# Patient Record
Sex: FEMALE | Race: WHITE | NOT HISPANIC OR LATINO | Employment: FULL TIME | URBAN - METROPOLITAN AREA
[De-identification: names, ages, dates, MRNs, and addresses within clinical notes are randomized per-mention and may not be internally consistent; named-entity substitution may affect disease eponyms.]

---

## 2019-08-26 ENCOUNTER — TRANSCRIBE ORDERS (OUTPATIENT)
Dept: LAB | Facility: HOSPITAL | Age: 34
End: 2019-08-26

## 2019-08-26 ENCOUNTER — LAB (OUTPATIENT)
Dept: LAB | Facility: HOSPITAL | Age: 34
End: 2019-08-26

## 2019-08-26 DIAGNOSIS — Z34.81 PRENATAL CARE, SUBSEQUENT PREGNANCY, FIRST TRIMESTER: ICD-10-CM

## 2019-08-26 DIAGNOSIS — Z3A.12 12 WEEKS GESTATION OF PREGNANCY: ICD-10-CM

## 2019-08-26 DIAGNOSIS — Z3A.12 12 WEEKS GESTATION OF PREGNANCY: Primary | ICD-10-CM

## 2019-08-26 LAB
ABO GROUP BLD: NORMAL
AMPHET+METHAMPHET UR QL: NEGATIVE
AMPHETAMINES UR QL: NEGATIVE
BARBITURATES UR QL SCN: NEGATIVE
BASOPHILS # BLD AUTO: 0.03 10*3/MM3 (ref 0–0.2)
BASOPHILS NFR BLD AUTO: 0.3 % (ref 0–1.5)
BENZODIAZ UR QL SCN: NEGATIVE
BLD GP AB SCN SERPL QL: NEGATIVE
BUPRENORPHINE SERPL-MCNC: NEGATIVE NG/ML
CANNABINOIDS SERPL QL: NEGATIVE
COCAINE UR QL: NEGATIVE
DEPRECATED RDW RBC AUTO: 43.8 FL (ref 37–54)
EOSINOPHIL # BLD AUTO: 0.14 10*3/MM3 (ref 0–0.4)
EOSINOPHIL NFR BLD AUTO: 1.3 % (ref 0.3–6.2)
ERYTHROCYTE [DISTWIDTH] IN BLOOD BY AUTOMATED COUNT: 12.1 % (ref 12.3–15.4)
GLUCOSE BLD-MCNC: 69 MG/DL (ref 65–99)
HBV SURFACE AG SERPL QL IA: NORMAL
HCT VFR BLD AUTO: 46.1 % (ref 34–46.6)
HCV AB SER DONR QL: NORMAL
HGB BLD-MCNC: 15 G/DL (ref 12–15.9)
HIV1+2 AB SER QL: NORMAL
IMM GRANULOCYTES # BLD AUTO: 0.03 10*3/MM3 (ref 0–0.05)
IMM GRANULOCYTES NFR BLD AUTO: 0.3 % (ref 0–0.5)
LYMPHOCYTES # BLD AUTO: 0.79 10*3/MM3 (ref 0.7–3.1)
LYMPHOCYTES NFR BLD AUTO: 7.4 % (ref 19.6–45.3)
MCH RBC QN AUTO: 31.6 PG (ref 26.6–33)
MCHC RBC AUTO-ENTMCNC: 32.5 G/DL (ref 31.5–35.7)
MCV RBC AUTO: 97.1 FL (ref 79–97)
METHADONE UR QL SCN: NEGATIVE
MONOCYTES # BLD AUTO: 0.78 10*3/MM3 (ref 0.1–0.9)
MONOCYTES NFR BLD AUTO: 7.3 % (ref 5–12)
NEUTROPHILS # BLD AUTO: 8.87 10*3/MM3 (ref 1.7–7)
NEUTROPHILS NFR BLD AUTO: 83.4 % (ref 42.7–76)
NRBC BLD AUTO-RTO: 0 /100 WBC (ref 0–0.2)
OPIATES UR QL: NEGATIVE
OXYCODONE UR QL SCN: NEGATIVE
PCP UR QL SCN: NEGATIVE
PLATELET # BLD AUTO: 188 10*3/MM3 (ref 140–450)
PMV BLD AUTO: 11.5 FL (ref 6–12)
PROPOXYPH UR QL: NEGATIVE
RBC # BLD AUTO: 4.75 10*6/MM3 (ref 3.77–5.28)
RH BLD: POSITIVE
TRICYCLICS UR QL SCN: NEGATIVE
WBC NRBC COR # BLD: 10.64 10*3/MM3 (ref 3.4–10.8)

## 2019-08-26 PROCEDURE — G0432 EIA HIV-1/HIV-2 SCREEN: HCPCS

## 2019-08-26 PROCEDURE — 86803 HEPATITIS C AB TEST: CPT

## 2019-08-26 PROCEDURE — 82947 ASSAY GLUCOSE BLOOD QUANT: CPT

## 2019-08-26 PROCEDURE — 86850 RBC ANTIBODY SCREEN: CPT

## 2019-08-26 PROCEDURE — 86901 BLOOD TYPING SEROLOGIC RH(D): CPT

## 2019-08-26 PROCEDURE — 86900 BLOOD TYPING SEROLOGIC ABO: CPT

## 2019-08-26 PROCEDURE — 80081 OBSTETRIC PANEL INC HIV TSTG: CPT

## 2019-08-26 PROCEDURE — 80306 DRUG TEST PRSMV INSTRMNT: CPT

## 2019-08-26 PROCEDURE — 87340 HEPATITIS B SURFACE AG IA: CPT

## 2019-08-26 PROCEDURE — 85025 COMPLETE CBC W/AUTO DIFF WBC: CPT

## 2019-08-26 PROCEDURE — 36415 COLL VENOUS BLD VENIPUNCTURE: CPT

## 2019-08-27 LAB — RPR SER QL: NORMAL

## 2019-08-28 LAB — RUBV IGG SERPL IA-ACNC: NORMAL

## 2019-10-10 ENCOUNTER — LAB REQUISITION (OUTPATIENT)
Dept: LAB | Facility: HOSPITAL | Age: 34
End: 2019-10-10

## 2019-10-10 DIAGNOSIS — Z00.00 ROUTINE GENERAL MEDICAL EXAMINATION AT A HEALTH CARE FACILITY: ICD-10-CM

## 2019-10-10 PROCEDURE — 36415 COLL VENOUS BLD VENIPUNCTURE: CPT

## 2019-12-11 ENCOUNTER — LAB (OUTPATIENT)
Dept: LAB | Facility: HOSPITAL | Age: 34
End: 2019-12-11

## 2019-12-11 ENCOUNTER — TRANSCRIBE ORDERS (OUTPATIENT)
Dept: LAB | Facility: HOSPITAL | Age: 34
End: 2019-12-11

## 2019-12-11 DIAGNOSIS — Z34.83 PRENATAL CARE, SUBSEQUENT PREGNANCY, THIRD TRIMESTER: ICD-10-CM

## 2019-12-11 DIAGNOSIS — Z3A.28 28 WEEKS GESTATION OF PREGNANCY: ICD-10-CM

## 2019-12-11 DIAGNOSIS — Z3A.28 28 WEEKS GESTATION OF PREGNANCY: Primary | ICD-10-CM

## 2019-12-11 LAB
BLD GP AB SCN SERPL QL: NEGATIVE
DEPRECATED RDW RBC AUTO: 39.2 FL (ref 37–54)
ERYTHROCYTE [DISTWIDTH] IN BLOOD BY AUTOMATED COUNT: 11.7 % (ref 12.3–15.4)
GLUCOSE 1H P 100 G GLC PO SERPL-MCNC: 157 MG/DL (ref 65–140)
HCT VFR BLD AUTO: 38 % (ref 34–46.6)
HGB BLD-MCNC: 13 G/DL (ref 12–15.9)
MCH RBC QN AUTO: 31.6 PG (ref 26.6–33)
MCHC RBC AUTO-ENTMCNC: 34.2 G/DL (ref 31.5–35.7)
MCV RBC AUTO: 92.2 FL (ref 79–97)
PLATELET # BLD AUTO: 188 10*3/MM3 (ref 140–450)
PMV BLD AUTO: 11.4 FL (ref 6–12)
RBC # BLD AUTO: 4.12 10*6/MM3 (ref 3.77–5.28)
WBC NRBC COR # BLD: 10.72 10*3/MM3 (ref 3.4–10.8)

## 2019-12-11 PROCEDURE — 36415 COLL VENOUS BLD VENIPUNCTURE: CPT

## 2019-12-11 PROCEDURE — 82950 GLUCOSE TEST: CPT

## 2019-12-11 PROCEDURE — 86850 RBC ANTIBODY SCREEN: CPT

## 2019-12-11 PROCEDURE — 85027 COMPLETE CBC AUTOMATED: CPT

## 2019-12-30 ENCOUNTER — TRANSCRIBE ORDERS (OUTPATIENT)
Dept: LAB | Facility: HOSPITAL | Age: 34
End: 2019-12-30

## 2019-12-30 ENCOUNTER — LAB (OUTPATIENT)
Dept: LAB | Facility: HOSPITAL | Age: 34
End: 2019-12-30

## 2019-12-30 DIAGNOSIS — Z34.83 PRENATAL CARE, SUBSEQUENT PREGNANCY, THIRD TRIMESTER: Primary | ICD-10-CM

## 2019-12-30 DIAGNOSIS — Z3A.29 29 WEEKS GESTATION OF PREGNANCY: ICD-10-CM

## 2019-12-30 DIAGNOSIS — Z34.83 PRENATAL CARE, SUBSEQUENT PREGNANCY, THIRD TRIMESTER: ICD-10-CM

## 2019-12-30 LAB
GLUCOSE 1H P 100 G GLC PO SERPL-MCNC: 197 MG/DL (ref 74–180)
GLUCOSE 2H P 100 G GLC PO SERPL-MCNC: 135 MG/DL (ref 74–155)
GLUCOSE 3H P 100 G GLC PO SERPL-MCNC: 135 MG/DL (ref 74–140)
GLUCOSE P FAST SERPL-MCNC: 85 MG/DL (ref 65–99)

## 2019-12-30 PROCEDURE — 36415 COLL VENOUS BLD VENIPUNCTURE: CPT

## 2019-12-30 PROCEDURE — 82951 GLUCOSE TOLERANCE TEST (GTT): CPT

## 2019-12-30 PROCEDURE — 82952 GTT-ADDED SAMPLES: CPT

## 2020-01-31 LAB — EXTERNAL GROUP B STREP ANTIGEN: POSITIVE

## 2020-02-06 ENCOUNTER — HOSPITAL ENCOUNTER (OUTPATIENT)
Facility: HOSPITAL | Age: 35
Discharge: HOME | End: 2020-02-06
Attending: OBSTETRICS & GYNECOLOGY | Admitting: OBSTETRICS & GYNECOLOGY

## 2020-02-06 VITALS
HEART RATE: 96 BPM | BODY MASS INDEX: 33.63 KG/M2 | RESPIRATION RATE: 16 BRPM | DIASTOLIC BLOOD PRESSURE: 78 MMHG | HEIGHT: 64 IN | TEMPERATURE: 98 F | SYSTOLIC BLOOD PRESSURE: 123 MMHG | WEIGHT: 197 LBS

## 2020-02-06 PROBLEM — Z34.93 PREGNANT AND NOT YET DELIVERED IN THIRD TRIMESTER: Status: ACTIVE | Noted: 2020-02-06

## 2020-02-06 PROBLEM — O13.9 GESTATIONAL HYPERTENSION WITHOUT SIGNIFICANT PROTEINURIA: Status: ACTIVE | Noted: 2020-02-06

## 2020-02-06 LAB
ALP SERPL-CCNC: 146 U/L (ref 39–117)
ALT SERPL W P-5'-P-CCNC: 12 U/L (ref 1–33)
AST SERPL-CCNC: 20 U/L (ref 1–32)
BILIRUB SERPL-MCNC: 0.3 MG/DL (ref 0.2–1.2)
CREAT BLD-MCNC: 0.43 MG/DL (ref 0.57–1)
DEPRECATED RDW RBC AUTO: 39.4 FL (ref 37–54)
ERYTHROCYTE [DISTWIDTH] IN BLOOD BY AUTOMATED COUNT: 11.9 % (ref 12.3–15.4)
HCT VFR BLD AUTO: 38.5 % (ref 34–46.6)
HGB BLD-MCNC: 12.4 G/DL (ref 12–15.9)
LDH SERPL-CCNC: 218 U/L (ref 135–214)
MCH RBC QN AUTO: 29.1 PG (ref 26.6–33)
MCHC RBC AUTO-ENTMCNC: 32.2 G/DL (ref 31.5–35.7)
MCV RBC AUTO: 90.4 FL (ref 79–97)
PLATELET # BLD AUTO: 221 10*3/MM3 (ref 140–450)
PMV BLD AUTO: 11.2 FL (ref 6–12)
POC ALBUMIN: NEGATIVE
RBC # BLD AUTO: 4.26 10*6/MM3 (ref 3.77–5.28)
URATE SERPL-MCNC: 2.4 MG/DL (ref 2.4–5.7)
WBC NRBC COR # BLD: 12.92 10*3/MM3 (ref 3.4–10.8)

## 2020-02-06 PROCEDURE — 83615 LACTATE (LD) (LDH) ENZYME: CPT | Performed by: OBSTETRICS & GYNECOLOGY

## 2020-02-06 PROCEDURE — 84550 ASSAY OF BLOOD/URIC ACID: CPT | Performed by: OBSTETRICS & GYNECOLOGY

## 2020-02-06 PROCEDURE — 81002 URINALYSIS NONAUTO W/O SCOPE: CPT | Performed by: OBSTETRICS & GYNECOLOGY

## 2020-02-06 PROCEDURE — 85027 COMPLETE CBC AUTOMATED: CPT | Performed by: OBSTETRICS & GYNECOLOGY

## 2020-02-06 PROCEDURE — 59025 FETAL NON-STRESS TEST: CPT

## 2020-02-06 PROCEDURE — 84075 ASSAY ALKALINE PHOSPHATASE: CPT | Performed by: OBSTETRICS & GYNECOLOGY

## 2020-02-06 PROCEDURE — 84450 TRANSFERASE (AST) (SGOT): CPT | Performed by: OBSTETRICS & GYNECOLOGY

## 2020-02-06 PROCEDURE — 82565 ASSAY OF CREATININE: CPT | Performed by: OBSTETRICS & GYNECOLOGY

## 2020-02-06 PROCEDURE — G0463 HOSPITAL OUTPT CLINIC VISIT: HCPCS

## 2020-02-06 PROCEDURE — 84460 ALANINE AMINO (ALT) (SGPT): CPT | Performed by: OBSTETRICS & GYNECOLOGY

## 2020-02-06 PROCEDURE — 99218 PR INITIAL OBSERVATION CARE/DAY 30 MINUTES: CPT | Performed by: OBSTETRICS & GYNECOLOGY

## 2020-02-06 PROCEDURE — 82247 BILIRUBIN TOTAL: CPT | Performed by: OBSTETRICS & GYNECOLOGY

## 2020-02-07 ENCOUNTER — TRANSCRIBE ORDERS (OUTPATIENT)
Dept: LAB | Facility: HOSPITAL | Age: 35
End: 2020-02-07

## 2020-02-07 ENCOUNTER — LAB (OUTPATIENT)
Dept: LAB | Facility: HOSPITAL | Age: 35
End: 2020-02-07

## 2020-02-07 DIAGNOSIS — Z34.83 PRENATAL CARE, SUBSEQUENT PREGNANCY, THIRD TRIMESTER: ICD-10-CM

## 2020-02-07 DIAGNOSIS — Z3A.37 37 WEEKS GESTATION OF PREGNANCY: Primary | ICD-10-CM

## 2020-02-07 LAB
CREAT UR-MCNC: 193.7 MG/DL
PROT UR-MCNC: 28 MG/DL

## 2020-02-07 PROCEDURE — 82570 ASSAY OF URINE CREATININE: CPT

## 2020-02-07 PROCEDURE — 84156 ASSAY OF PROTEIN URINE: CPT

## 2020-02-07 NOTE — DISCHARGE SUMMARY
Date of Discharge:  2/6/2020    Discharge Diagnosis: Stable pregnancy    Presenting Problem/History of Present Illness  Pregnancy     Hospital Course  Patient is a 34 y.o. female presented with increased Bps at home 142/96      Consults:   Consults     No orders found from 1/8/2020 to 2/7/2020.          Pertinent Test Results:   Lab Results   Component Value Date    WBC 12.92 (H) 02/06/2020    HGB 12.4 02/06/2020    HCT 38.5 02/06/2020    MCV 90.4 02/06/2020     02/06/2020    HEPBSAG Non-Reactive 08/26/2019     Results from last 7 days   Lab Units 02/06/20 2112   AST (SGOT) U/L 20     Results from last 7 days   Lab Units 02/06/20 2112   ALT (SGPT) U/L 12      Results from last 7 days   Lab Units 02/06/20 2112   CREATININE mg/dL 0.43*      Results from last 7 days   Lab Units 02/06/20 2112   BILIRUBIN mg/dL 0.3       Condition on Discharge:  stable    Vital Signs  Temp:  [98 °F (36.7 °C)] 98 °F (36.7 °C)  Heart Rate:  [] 96  Resp:  [16] 16  BP: (123-127)/(78-82) 123/78    Physical Exam:  Vital Signs Range for the last 24 hours  Temperature: Temp:  [98 °F (36.7 °C)] 98 °F (36.7 °C)   BP: BP: (123-127)/(78-82) 123/78   Pulse: Heart Rate:  [] 96   Respirations: Resp:  [16] 16         Discharge Disposition  Home or Self Care    Discharge Medications     Discharge Medications      ASK your doctor about these medications      Instructions Start Date   PRENATAL GUMMY VITAMIN   2 each, Oral, Daily             Activity at Discharge: regular    Follow-up Appointments  Future Appointments   Date Time Provider Department Center   2/18/2020  1:00 PM [facility]     to see Dr. Figueroa in am    Test Results Pending at Discharge       Audrey Love MD  02/06/20  10:05 PM

## 2020-02-07 NOTE — H&P
Admit H&P    Patient Name: Temitope Plata  : 1985  MRN: [id number]  Date of Service: 2020  Referring Provider: Cristel Figueroa     ID: 34 y.o.  at 37w3d    Chief complaint: increased BP at home    Pregnancy course significant for:    Patient Active Problem List    Diagnosis   • Pregnant and not yet delivered in third trimester [Z34.93]   • Gestational hypertension without significant proteinuria [O13.9]       Her prenatal care is benign.  Her previous obstetric/gynecological history is noted for is remarkable for .4th degree tear and repair    The following portions of the patients history were reviewed and updated as appropriate: current medications, allergies, past medical history, past surgical history, past social history and problem list.       REVIEW OF SYSTEMS  irregular contractions   Patient reports good fetal movement.  She denies any vaginal bleeding, leakage of fluid, or contractions.  She denies headache, visual changes, or right upper quadrant pain.  All other systems were reviewed and were negative.    Prenatal Labs  Lab Results   Component Value Date    HGB 12.4 2020    HEPBSAG Non-Reactive 2019    ABO O 2019    RH Positive 2019    ABSCRN Negative 2019    [id number] Non-Reactive 2019    HEPCVIRUSABY Non-Reactive 2019       OB HISTORY    OB History        2    Para   1    Term   1       0    AB   0    Living   1       SAB   0    TAB   0    Ectopic   0    Molar   0    Multiple   0    Live Births   1              PAST MEDICAL HISTORY    Past Medical History:   Diagnosis Date   • Hemorrhoid    • Ovarian cyst      PAST SURGICAL HISTORY     has a past surgical history that includes [place] tooth extraction and Ovarian cyst removal ().  CURRENT MEDICATIONS    No current facility-administered medications on file prior to encounter.      Current Outpatient Medications on File Prior to Encounter   Medication Sig Dispense Refill   •  PRENATAL GUMMY VITAMIN Chew 2 each Daily.       ALLERGIES    Patient has no known allergies.  SOCIAL HISTORY    Social History     Tobacco Use   Smoking Status Never Smoker   Smokeless Tobacco Never Used     Social History     Substance and Sexual Activity   Alcohol Use No     Social History     Substance and Sexual Activity   Drug Use No     FAMILY HISTORY  The patient has a family history of    PHYSICAL EXAMINATION    Vital Signs Range for the last 24 hours  Temperature: Temp:  [98 °F (36.7 °C)] 98 °F (36.7 °C)   Temp Source: Temp src: Oral   BP: BP: (123-127)/(78-82) 123/78   Pulse: Heart Rate:  [] 96   Respirations: Resp:  [16] 16   Weight: 89.4 kg (197 lb)     Constitutional:  Well developed, well nourished, no acute distress, well-groomed. Red facial flushing  HEENT: Grossly normal.  Respiratory:   normal breath sounds.   Cardiovascular:  Normal rate.   Abdomen:  Soft, gravid, nontender.  Uterus: Soft, nontender. Fundus appropriate for dates.  Neurologic:  Alert & oriented x 4,  no focal deficits noted.   Psychiatric:  Speech and behavior appropriate.   Extremities: no cyanosis, clubbing or edema, no evidence of DVT.      External Fetal Monitoring:    Fetal Heart Rate Assessment   Method: Fetal HR Assessment Method: external   Beats/min: Fetal HR (beats/min): 140   Baseline: Fetal Heart Baseline Rate: normal range   Varibility: Fetal HR Variability: moderate (amplitude range 6 to 25 bpm)   Accels: Fetal HR Accelerations: greater than/equal to 15 bpm, lasting at least 15 seconds   Decels: Fetal HR Decelerations: absent   Tracing Category:       Uterine Assessment   Method: Method: external tocotransducer   Frequency (min): Contraction Frequency (Minutes): 3-5   Ctx Count in 10 min:     Duration:     Intensity: Contraction Intensity: mild by palpation   Intensity by IUPC:     Resting Tone: Uterine Resting Tone: soft by palpation   Resting Tone by IUPC:     [place] Units:       Cervix: Exam by: Method:  sterile exam per physician   Dilation:  ft   Effacement:  th   Station:  -3         Labs:      Lab Results (last 24 hours)     Procedure Component Value Units Date/Time    Preeclampsia Panel [[id number]]  (Abnormal) Collected:  20    Specimen:  Blood Updated:  20     Alkaline Phosphatase 146 U/L      ALT (SGPT) 12 U/L      AST (SGOT) 20 U/L      Creatinine 0.43 mg/dL      Total Bilirubin 0.3 mg/dL       U/L      Uric Acid 2.4 mg/dL     CBC (No Diff) [[id number]]  (Abnormal) Collected:  20    Specimen:  Blood Updated:  20     WBC 12.92 10*3/mm3      RBC 4.26 10*6/mm3      Hemoglobin 12.4 g/dL      Hematocrit 38.5 %      MCV 90.4 fL      MCH 29.1 pg      MCHC 32.2 g/dL      RDW 11.9 %      RDW-SD 39.4 fl      MPV 11.2 fL      Platelets 221 10*3/mm3     POC Albumin [[id number]] Collected:  20    Specimen:  Urine Updated:  20     Albumin Negative          ASSESSMENT/PLAN:  34 y.o. female  at 37w3d with     Patient Active Problem List    Diagnosis   • Pregnant and not yet delivered in third trimester [Z34.93]   • Gestational hypertension without significant proteinuria [O13.9]   Normal labs, normal bps here  Pt to see Dr. Figueroa in the am   Audrey Love MD  2020  10:04 PM

## 2020-02-13 ENCOUNTER — HOSPITAL ENCOUNTER (OUTPATIENT)
Facility: HOSPITAL | Age: 35
Setting detail: OBSERVATION
Discharge: HOME | End: 2020-02-13
Attending: OBSTETRICS & GYNECOLOGY | Admitting: OBSTETRICS & GYNECOLOGY

## 2020-02-13 VITALS
WEIGHT: 198 LBS | TEMPERATURE: 98 F | DIASTOLIC BLOOD PRESSURE: 85 MMHG | HEIGHT: 64 IN | BODY MASS INDEX: 33.8 KG/M2 | SYSTOLIC BLOOD PRESSURE: 119 MMHG | HEART RATE: 111 BPM | RESPIRATION RATE: 18 BRPM

## 2020-02-13 PROBLEM — Z34.90 PREGNANCY: Status: ACTIVE | Noted: 2020-02-13

## 2020-02-13 PROCEDURE — 59025 FETAL NON-STRESS TEST: CPT

## 2020-02-13 PROCEDURE — 99218 PR INITIAL OBSERVATION CARE/DAY 30 MINUTES: CPT | Performed by: OBSTETRICS & GYNECOLOGY

## 2020-02-13 PROCEDURE — G0378 HOSPITAL OBSERVATION PER HR: HCPCS

## 2020-02-13 NOTE — H&P
\[facility]  Obstetric History and Physical    Referring Provider: Cristel Figueroa MD      Chief Complaint   Patient presents with   • Contractions       Subjective     Patient is a 34 y.o. female  currently at 38w3d, who presents with C/O contractions.  Patient reports mild irregular contractions started at 3 AM this morning without associated leaking of fluid or vaginal bleeding.  Since that time the contractions vaccine coming less frequent and less painful without any other concerns or new complaints.  Prenatal care by Dr. Figueroa without complications to date other than transient hypertension without proteinuria.  OB history significant for previous term vaginal delivery..    .    The following portions of the patients history were reviewed and updated as appropriate: current medications, allergies, past medical history, past surgical history, past family history, past social history and problem list .       Prenatal Information:   Maternal Prenatal Labs  Blood Type No results found for: ABO   Rh Status No results found for: RH   Antibody Screen No results found for: ABSCRN   Gonnorhea No results found for: GCCX   Chlamydia No results found for: CLAMYDCU   RPR No results found for: RPR   Syphilis Antibody No results found for: SYPHILIS   Rubella No results found for: RUBELLAIGGIN   Hepatitis B Surface Antigen No results found for: HEPBSAG   HIV-1 Antibody No results found for: LABHIV1   Hepatitis C Antibody No results found for: HEPCAB   Rapid Urin Drug Screen No results found for: AMPMETHU, BARBITSCNUR, LABBENZSCN, LABMETHSCN, LABOPIASCN, THCURSCR, COCAINEUR, AMPHETSCREEN, PROPOXSCN, BUPRENORSCNU, METAMPSCNUR, OXYCODONESCN, TRICYCLICSCN   Group B Strep Culture No results found for: GBSANTIGEN           External Prenatal Results     Pregnancy Outside Results - Transcribed From Office Records - See Scanned Records For Details     Test Value Date Time    Hgb 12.4 g/dL 20      13.0 g/dL  19 1216      15.0 g/dL 19 1506    Hct 38.5 % 20 2112      38.0 % 19 1216      46.1 % 19 1506    ABO O  19 1506    Rh Positive  19 1506    Antibody Screen Negative  19 1216      Negative  19 1506    Glucose Fasting GTT       Glucose Tolerance Test 1 hour       Glucose Tolerance Test 3 hour       Gonorrhea (discrete)       Chlamydia (discrete)       RPR Non-Reactive  19 1506    VDRL       Syphilis Antibody       Rubella Equivocal  19 1506    HBsAg Non-Reactive  19 1506    Herpes Simplex Virus PCR       Herpes Simplex VIrus Culture       HIV Non-Reactive  19 1506    Hep C RNA Quant PCR       Hep C Antibody Non-Reactive  19 1506    AFP       Group B Strep Positive  20     GBS Susceptibility to Clindamycin       GBS Susceptibility to Erythromycin       Fetal Fibronectin       Genetic Testing, Maternal Blood             Drug Screening     Test Value Date Time    Urine Drug Screen       Amphetamine Screen Negative  19 1506    Barbiturate Screen Negative  19 1506    Benzodiazepine Screen Negative  19 1506    Methadone Screen Negative  19 1506    Phencyclidine Screen Negative  19 1506    Opiates Screen Negative  19 1506    THC Screen Negative  19 1506    Cocaine Screen       Propoxyphene Screen Negative  19 1506    Buprenorphine Screen Negative  19 1506    Methamphetamine Screen       Oxycodone Screen Negative  19 1506    Tricyclic Antidepressants Screen Negative  19 1506                  Past OB History:       OB History    Para Term  AB Living   2 1 1 0 0 1   SAB TAB Ectopic Molar Multiple Live Births   0 0 0 0 0 1      # Outcome Date GA Lbr David/2nd Weight Sex Delivery Anes PTL Lv   2 Current            1 Term 10/19/15 37w0d  2892 g (6 lb 6 oz) F Vag-Spont EPI  RANDAL      Complications: Abruptio Placenta, Fourth degree perineal laceration       Past Medical  History: Past Medical History:   Diagnosis Date   • Hemorrhoid    • Ovarian cyst       Past Surgical History Past Surgical History:   Procedure Laterality Date   • OVARIAN CYST REMOVAL  2015   • WISDOM TOOTH EXTRACTION        Family History: History reviewed. No pertinent family history.   Social History:  reports that she has never smoked. She has never used smokeless tobacco.   reports that she does not drink alcohol.   reports that she does not use drugs.                   General ROS Negative Findings:Headaches, Visual Changes, Epigastric pain, Anorexia, Nausia/Vomiting, ROM and Vaginal Bleeding    ROS     All other systems have been reviewed and are neg  Objective       Vital Signs Range for the last 24 hours  Temperature: Temp:  [98 °F (36.7 °C)] 98 °F (36.7 °C)   Temp Source: Temp src: Oral   BP: BP: (119)/(85) 119/85   Pulse: Heart Rate:  [111] 111   Respirations: Resp:  [18] 18   SPO2:     O2 Amount (l/min):     O2 Devices     Weight: Weight:  [89.8 kg (198 lb)] 89.8 kg (198 lb)     Physical Examination:   General:   alert, appears stated age and cooperative   Skin:   normal   HEENT:  Sclera clear   Lungs:      Heart:      Abdomen:  Soft, gravid uterus, no guarding, rebound benign exam   Lower Extremities  no edema, no calf tenderness   Pelvis:  Exam deferred.     Neuro: No focal deficits DTRs 2+ 4 no clonus    Presentation:    Cervix: Exam by: Method: sterile exam per RN   Dilation:  1+   Effacement: Cervical Effacement: 70%   Station:  -3  VTX       Fetal Heart Rate Assessment   Method: Fetal HR Assessment Method: external   Beats/min: Fetal HR (beats/min): 135   Baseline: Fetal Heart Baseline Rate: normal range   Varibility: Fetal HR Variability: moderate (amplitude range 6 to 25 bpm)   Accels: Fetal HR Accelerations: greater than/equal to 15 bpm, lasting at least 15 seconds   Decels: Fetal HR Decelerations: absent   Tracing Category:     NST-indications contractions interpretation reactive, moderate  variability, accelerations present 15 x 15, no decelerations, onset 9:48 AM end time 11:10 AM, irregular contractions noted.  Uterine Assessment   Method: Method: external tocotransducer   Frequency (min): Contraction Frequency (Minutes): 5   Ctx Count in 10 min:     Duration:     Intensity: Contraction Intensity: mild by palpation   Intensity by IUPC:     Resting Tone: Uterine Resting Tone: soft by palpation   Resting Tone by IUPC:     [place] Units:       Laboratory Results:   Lab Results (last 24 hours)     Procedure Component Value Units Date/Time    Group B Streptococcus Culture - Swab, Vaginal/Rectum [[id number]] Resulted:  20     Specimen:  Swab from Vaginal/Rectum Updated:  20 1023     External Strep Group B Ag Positive        Radiology Review:   Imaging Results (Last 24 Hours)     ** No results found for the last 24 hours. **        Other Studies:    Assessment/Plan       Pregnancy        Assessment:  1.  Intrauterine pregnancy at 38w3d weeks gestation with reactive fetal status.    2.  No evidence of labor rupture membranes.  3.  GBS positive  4.  History of term vaginal delivery  5.  History of fourth degree perineal laceration  Plan:  1. D/C to home, labor instructions given, follow-up Dr. Figueroa tomorrow for routine  visit PRN.  Kick count.  2. Plan of care has been reviewed with patient.  3.  Risks, benefits of treatment plan have been discussed.  4.  All questions have been answered.  5      Jose Spring DO  2020  11:19 AM

## 2020-02-13 NOTE — NURSING NOTE
D/c instructions provided to pt. Pt states understanding of instructions & denies further questions/concerns. Pt states will keep scheduled appt with [facility] for tomorrow am (2/14/2020). CTX/VB/LF/DFM discussed & pt v/u. [facility] office/labor spencer/pt exchange phone numbers provided in case questions/concerns arise while at home. Pt denies need for wheelchair & is ambulating independently off unit in stable condition with all belongings to private vehicle.

## 2020-02-17 ENCOUNTER — PREP FOR SURGERY (OUTPATIENT)
Dept: OTHER | Facility: HOSPITAL | Age: 35
End: 2020-02-17

## 2020-02-17 DIAGNOSIS — Z34.90 TERM PREGNANCY: Primary | ICD-10-CM

## 2020-02-17 RX ORDER — LIDOCAINE HYDROCHLORIDE 10 MG/ML
5 INJECTION, SOLUTION EPIDURAL; INFILTRATION; INTRACAUDAL; PERINEURAL AS NEEDED
Status: CANCELLED | OUTPATIENT
Start: 2020-02-17

## 2020-02-17 RX ORDER — CEFAZOLIN SODIUM 2 G/100ML
2 INJECTION, SOLUTION INTRAVENOUS ONCE
Status: CANCELLED | OUTPATIENT
Start: 2020-02-17 | End: 2020-02-17

## 2020-02-17 RX ORDER — SODIUM CHLORIDE, SODIUM LACTATE, POTASSIUM CHLORIDE, CALCIUM CHLORIDE 600; 310; 30; 20 MG/100ML; MG/100ML; MG/100ML; MG/100ML
125 INJECTION, SOLUTION INTRAVENOUS CONTINUOUS
Status: CANCELLED | OUTPATIENT
Start: 2020-02-17

## 2020-02-17 RX ORDER — OXYTOCIN-SODIUM CHLORIDE 0.9% IV SOLN 30 UNIT/500ML 30-0.9/5 UT/ML-%
85 SOLUTION INTRAVENOUS ONCE
Status: CANCELLED | OUTPATIENT
Start: 2020-02-17 | End: 2020-02-17

## 2020-02-17 RX ORDER — METHYLERGONOVINE MALEATE 0.2 MG/ML
200 INJECTION INTRAVENOUS ONCE AS NEEDED
Status: CANCELLED | OUTPATIENT
Start: 2020-02-17

## 2020-02-17 RX ORDER — TRISODIUM CITRATE DIHYDRATE AND CITRIC ACID MONOHYDRATE 500; 334 MG/5ML; MG/5ML
30 SOLUTION ORAL ONCE
Status: CANCELLED | OUTPATIENT
Start: 2020-02-17 | End: 2020-02-17

## 2020-02-17 RX ORDER — SODIUM CHLORIDE 0.9 % (FLUSH) 0.9 %
3-10 SYRINGE (ML) INJECTION AS NEEDED
Status: CANCELLED | OUTPATIENT
Start: 2020-02-17

## 2020-02-17 RX ORDER — CARBOPROST TROMETHAMINE 250 UG/ML
250 INJECTION, SOLUTION INTRAMUSCULAR AS NEEDED
Status: CANCELLED | OUTPATIENT
Start: 2020-02-17

## 2020-02-17 RX ORDER — SODIUM CHLORIDE 0.9 % (FLUSH) 0.9 %
3 SYRINGE (ML) INJECTION EVERY 12 HOURS SCHEDULED
Status: CANCELLED | OUTPATIENT
Start: 2020-02-17

## 2020-02-17 RX ORDER — OXYTOCIN-SODIUM CHLORIDE 0.9% IV SOLN 30 UNIT/500ML 30-0.9/5 UT/ML-%
650 SOLUTION INTRAVENOUS ONCE
Status: CANCELLED | OUTPATIENT
Start: 2020-02-17 | End: 2020-02-17

## 2020-02-17 RX ORDER — MISOPROSTOL 200 UG/1
800 TABLET ORAL AS NEEDED
Status: CANCELLED | OUTPATIENT
Start: 2020-02-17

## 2020-02-18 ENCOUNTER — APPOINTMENT (OUTPATIENT)
Dept: PREADMISSION TESTING | Facility: HOSPITAL | Age: 35
End: 2020-02-18

## 2020-02-18 VITALS — BODY MASS INDEX: 32.59 KG/M2 | HEIGHT: 66 IN | WEIGHT: 202.8 LBS

## 2020-02-18 DIAGNOSIS — Z34.90 TERM PREGNANCY: ICD-10-CM

## 2020-02-18 LAB
ABO GROUP BLD: NORMAL
BLD GP AB SCN SERPL QL: NEGATIVE
DEPRECATED RDW RBC AUTO: 40.2 FL (ref 37–54)
ERYTHROCYTE [DISTWIDTH] IN BLOOD BY AUTOMATED COUNT: 12.1 % (ref 12.3–15.4)
HCT VFR BLD AUTO: 39.1 % (ref 34–46.6)
HGB BLD-MCNC: 12.8 G/DL (ref 12–15.9)
MCH RBC QN AUTO: 29.8 PG (ref 26.6–33)
MCHC RBC AUTO-ENTMCNC: 32.7 G/DL (ref 31.5–35.7)
MCV RBC AUTO: 91.1 FL (ref 79–97)
PLATELET # BLD AUTO: 207 10*3/MM3 (ref 140–450)
PMV BLD AUTO: 11.5 FL (ref 6–12)
RBC # BLD AUTO: 4.29 10*6/MM3 (ref 3.77–5.28)
RH BLD: POSITIVE
T&S EXPIRATION DATE: NORMAL
WBC NRBC COR # BLD: 13.49 10*3/MM3 (ref 3.4–10.8)

## 2020-02-18 PROCEDURE — 85027 COMPLETE CBC AUTOMATED: CPT | Performed by: OBSTETRICS & GYNECOLOGY

## 2020-02-18 PROCEDURE — 36415 COLL VENOUS BLD VENIPUNCTURE: CPT

## 2020-02-18 PROCEDURE — 86901 BLOOD TYPING SEROLOGIC RH(D): CPT | Performed by: OBSTETRICS & GYNECOLOGY

## 2020-02-18 PROCEDURE — 86900 BLOOD TYPING SEROLOGIC ABO: CPT | Performed by: OBSTETRICS & GYNECOLOGY

## 2020-02-18 PROCEDURE — 86850 RBC ANTIBODY SCREEN: CPT | Performed by: OBSTETRICS & GYNECOLOGY

## 2020-02-19 ENCOUNTER — ANESTHESIA EVENT (OUTPATIENT)
Dept: LABOR AND DELIVERY | Facility: HOSPITAL | Age: 35
End: 2020-02-19

## 2020-02-19 ENCOUNTER — ANESTHESIA (OUTPATIENT)
Dept: LABOR AND DELIVERY | Facility: HOSPITAL | Age: 35
End: 2020-02-19

## 2020-02-19 ENCOUNTER — HOSPITAL ENCOUNTER (INPATIENT)
Facility: HOSPITAL | Age: 35
LOS: 3 days | Discharge: HOME | End: 2020-02-22
Attending: OBSTETRICS & GYNECOLOGY | Admitting: OBSTETRICS & GYNECOLOGY

## 2020-02-19 DIAGNOSIS — Z34.90 TERM PREGNANCY: ICD-10-CM

## 2020-02-19 DIAGNOSIS — Z01.818 TUBAL LIGATION EVALUATION: ICD-10-CM

## 2020-02-19 DIAGNOSIS — Z98.891 S/P CESAREAN SECTION: Primary | ICD-10-CM

## 2020-02-19 PROCEDURE — 0UT70ZZ RESECTION OF BILATERAL FALLOPIAN TUBES, OPEN APPROACH: ICD-10-PCS | Performed by: OBSTETRICS & GYNECOLOGY

## 2020-02-19 PROCEDURE — 88302 TISSUE EXAM BY PATHOLOGIST: CPT | Performed by: OBSTETRICS & GYNECOLOGY

## 2020-02-19 PROCEDURE — 25010000002 PHENYLEPHRINE PER 1 ML: Performed by: NURSE ANESTHETIST, CERTIFIED REGISTERED

## 2020-02-19 PROCEDURE — 25010000003 MORPHINE PER 10 MG: Performed by: NURSE ANESTHETIST, CERTIFIED REGISTERED

## 2020-02-19 PROCEDURE — 59025 FETAL NON-STRESS TEST: CPT

## 2020-02-19 PROCEDURE — 25010000002 MIDAZOLAM PER 1 MG: Performed by: NURSE ANESTHETIST, CERTIFIED REGISTERED

## 2020-02-19 PROCEDURE — 25010000002 CEFAZOLIN PER 500 MG: Performed by: OBSTETRICS & GYNECOLOGY

## 2020-02-19 PROCEDURE — 25010000002 FENTANYL CITRATE (PF) 100 MCG/2ML SOLUTION: Performed by: NURSE ANESTHETIST, CERTIFIED REGISTERED

## 2020-02-19 PROCEDURE — 25010000002 METOCLOPRAMIDE PER 10 MG: Performed by: NURSE ANESTHETIST, CERTIFIED REGISTERED

## 2020-02-19 PROCEDURE — 25010000002 ONDANSETRON PER 1 MG: Performed by: NURSE ANESTHETIST, CERTIFIED REGISTERED

## 2020-02-19 RX ORDER — OXYTOCIN-SODIUM CHLORIDE 0.9% IV SOLN 30 UNIT/500ML 30-0.9/5 UT/ML-%
SOLUTION INTRAVENOUS AS NEEDED
Status: DISCONTINUED | OUTPATIENT
Start: 2020-02-19 | End: 2020-02-19 | Stop reason: SURG

## 2020-02-19 RX ORDER — MIDAZOLAM HYDROCHLORIDE 1 MG/ML
INJECTION INTRAMUSCULAR; INTRAVENOUS AS NEEDED
Status: DISCONTINUED | OUTPATIENT
Start: 2020-02-19 | End: 2020-02-19 | Stop reason: SURG

## 2020-02-19 RX ORDER — SODIUM CHLORIDE 0.9 % (FLUSH) 0.9 %
3 SYRINGE (ML) INJECTION EVERY 12 HOURS SCHEDULED
Status: DISCONTINUED | OUTPATIENT
Start: 2020-02-19 | End: 2020-02-19 | Stop reason: HOSPADM

## 2020-02-19 RX ORDER — IBUPROFEN 600 MG/1
600 TABLET ORAL EVERY 6 HOURS PRN
Status: DISCONTINUED | OUTPATIENT
Start: 2020-02-19 | End: 2020-02-22 | Stop reason: HOSPADM

## 2020-02-19 RX ORDER — BUPIVACAINE HYDROCHLORIDE 7.5 MG/ML
INJECTION, SOLUTION INTRASPINAL AS NEEDED
Status: DISCONTINUED | OUTPATIENT
Start: 2020-02-19 | End: 2020-02-19 | Stop reason: SURG

## 2020-02-19 RX ORDER — FAMOTIDINE 10 MG/ML
INJECTION, SOLUTION INTRAVENOUS AS NEEDED
Status: DISCONTINUED | OUTPATIENT
Start: 2020-02-19 | End: 2020-02-19 | Stop reason: SURG

## 2020-02-19 RX ORDER — DIPHENHYDRAMINE HCL 25 MG
25 CAPSULE ORAL EVERY 4 HOURS PRN
Status: DISCONTINUED | OUTPATIENT
Start: 2020-02-19 | End: 2020-02-19

## 2020-02-19 RX ORDER — MORPHINE SULFATE 0.5 MG/ML
INJECTION, SOLUTION EPIDURAL; INTRATHECAL; INTRAVENOUS AS NEEDED
Status: DISCONTINUED | OUTPATIENT
Start: 2020-02-19 | End: 2020-02-19 | Stop reason: SURG

## 2020-02-19 RX ORDER — TRISODIUM CITRATE DIHYDRATE AND CITRIC ACID MONOHYDRATE 500; 334 MG/5ML; MG/5ML
30 SOLUTION ORAL ONCE
Status: COMPLETED | OUTPATIENT
Start: 2020-02-19 | End: 2020-02-19

## 2020-02-19 RX ORDER — CEFAZOLIN SODIUM 2 G/100ML
2 INJECTION, SOLUTION INTRAVENOUS ONCE
Status: COMPLETED | OUTPATIENT
Start: 2020-02-19 | End: 2020-02-19

## 2020-02-19 RX ORDER — ONDANSETRON 2 MG/ML
4 INJECTION INTRAMUSCULAR; INTRAVENOUS ONCE
Status: DISCONTINUED | OUTPATIENT
Start: 2020-02-19 | End: 2020-02-19 | Stop reason: HOSPADM

## 2020-02-19 RX ORDER — METOCLOPRAMIDE HYDROCHLORIDE 5 MG/ML
INJECTION INTRAMUSCULAR; INTRAVENOUS AS NEEDED
Status: DISCONTINUED | OUTPATIENT
Start: 2020-02-19 | End: 2020-02-19 | Stop reason: SURG

## 2020-02-19 RX ORDER — HYDROMORPHONE HYDROCHLORIDE 1 MG/ML
0.5 INJECTION, SOLUTION INTRAMUSCULAR; INTRAVENOUS; SUBCUTANEOUS
Status: DISCONTINUED | OUTPATIENT
Start: 2020-02-19 | End: 2020-02-19 | Stop reason: HOSPADM

## 2020-02-19 RX ORDER — HYDROCODONE BITARTRATE AND ACETAMINOPHEN 5; 325 MG/1; MG/1
1 TABLET ORAL EVERY 4 HOURS PRN
Status: DISCONTINUED | OUTPATIENT
Start: 2020-02-19 | End: 2020-02-21

## 2020-02-19 RX ORDER — SIMETHICONE 80 MG
80 TABLET,CHEWABLE ORAL 4 TIMES DAILY
Status: DISCONTINUED | OUTPATIENT
Start: 2020-02-19 | End: 2020-02-22 | Stop reason: HOSPADM

## 2020-02-19 RX ORDER — ONDANSETRON 2 MG/ML
INJECTION INTRAMUSCULAR; INTRAVENOUS AS NEEDED
Status: DISCONTINUED | OUTPATIENT
Start: 2020-02-19 | End: 2020-02-19 | Stop reason: SURG

## 2020-02-19 RX ORDER — SODIUM CHLORIDE, SODIUM LACTATE, POTASSIUM CHLORIDE, CALCIUM CHLORIDE 600; 310; 30; 20 MG/100ML; MG/100ML; MG/100ML; MG/100ML
125 INJECTION, SOLUTION INTRAVENOUS CONTINUOUS
Status: DISCONTINUED | OUTPATIENT
Start: 2020-02-19 | End: 2020-02-19

## 2020-02-19 RX ORDER — SIMETHICONE 80 MG
80 TABLET,CHEWABLE ORAL 4 TIMES DAILY PRN
Status: DISCONTINUED | OUTPATIENT
Start: 2020-02-19 | End: 2020-02-22 | Stop reason: HOSPADM

## 2020-02-19 RX ORDER — ALUMINA, MAGNESIA, AND SIMETHICONE 2400; 2400; 240 MG/30ML; MG/30ML; MG/30ML
15 SUSPENSION ORAL EVERY 4 HOURS PRN
Status: DISCONTINUED | OUTPATIENT
Start: 2020-02-19 | End: 2020-02-22 | Stop reason: HOSPADM

## 2020-02-19 RX ORDER — HYDROCODONE BITARTRATE AND ACETAMINOPHEN 10; 325 MG/1; MG/1
1 TABLET ORAL EVERY 4 HOURS PRN
Status: DISCONTINUED | OUTPATIENT
Start: 2020-02-19 | End: 2020-02-21

## 2020-02-19 RX ORDER — LANOLIN
CREAM (ML) TOPICAL
Status: DISCONTINUED | OUTPATIENT
Start: 2020-02-19 | End: 2020-02-22 | Stop reason: HOSPADM

## 2020-02-19 RX ORDER — OXYCODONE AND ACETAMINOPHEN 7.5; 325 MG/1; MG/1
1 TABLET ORAL EVERY 4 HOURS PRN
Status: ACTIVE | OUTPATIENT
Start: 2020-02-19 | End: 2020-02-20

## 2020-02-19 RX ORDER — SODIUM CHLORIDE 0.9 % (FLUSH) 0.9 %
3-10 SYRINGE (ML) INJECTION AS NEEDED
Status: DISCONTINUED | OUTPATIENT
Start: 2020-02-19 | End: 2020-02-19 | Stop reason: HOSPADM

## 2020-02-19 RX ORDER — DIPHENHYDRAMINE HYDROCHLORIDE 50 MG/ML
25 INJECTION INTRAMUSCULAR; INTRAVENOUS EVERY 4 HOURS PRN
Status: DISCONTINUED | OUTPATIENT
Start: 2020-02-19 | End: 2020-02-22 | Stop reason: HOSPADM

## 2020-02-19 RX ORDER — CALCIUM CARBONATE 750 MG/1
750 TABLET, CHEWABLE ORAL 3 TIMES DAILY PRN
Status: DISCONTINUED | OUTPATIENT
Start: 2020-02-19 | End: 2020-02-22 | Stop reason: HOSPADM

## 2020-02-19 RX ORDER — NALOXONE HCL 0.4 MG/ML
0.4 VIAL (ML) INJECTION
Status: ACTIVE | OUTPATIENT
Start: 2020-02-19 | End: 2020-02-20

## 2020-02-19 RX ORDER — PRENATAL VIT/IRON FUM/FOLIC AC 27MG-0.8MG
1 TABLET ORAL DAILY
Status: DISCONTINUED | OUTPATIENT
Start: 2020-02-19 | End: 2020-02-22 | Stop reason: HOSPADM

## 2020-02-19 RX ORDER — LIDOCAINE HYDROCHLORIDE 10 MG/ML
5 INJECTION, SOLUTION EPIDURAL; INFILTRATION; INTRACAUDAL; PERINEURAL AS NEEDED
Status: DISCONTINUED | OUTPATIENT
Start: 2020-02-19 | End: 2020-02-19 | Stop reason: HOSPADM

## 2020-02-19 RX ORDER — FENTANYL CITRATE 50 UG/ML
INJECTION, SOLUTION INTRAMUSCULAR; INTRAVENOUS AS NEEDED
Status: DISCONTINUED | OUTPATIENT
Start: 2020-02-19 | End: 2020-02-19 | Stop reason: SURG

## 2020-02-19 RX ORDER — DOCUSATE SODIUM 100 MG/1
100 CAPSULE, LIQUID FILLED ORAL 2 TIMES DAILY
Status: DISCONTINUED | OUTPATIENT
Start: 2020-02-19 | End: 2020-02-22 | Stop reason: HOSPADM

## 2020-02-19 RX ORDER — KETOROLAC TROMETHAMINE 30 MG/ML
30 INJECTION, SOLUTION INTRAMUSCULAR; INTRAVENOUS ONCE AS NEEDED
Status: DISCONTINUED | OUTPATIENT
Start: 2020-02-19 | End: 2020-02-19 | Stop reason: HOSPADM

## 2020-02-19 RX ORDER — ONDANSETRON 2 MG/ML
4 INJECTION INTRAMUSCULAR; INTRAVENOUS EVERY 6 HOURS PRN
Status: DISCONTINUED | OUTPATIENT
Start: 2020-02-19 | End: 2020-02-22 | Stop reason: HOSPADM

## 2020-02-19 RX ORDER — ONDANSETRON 4 MG/1
4 TABLET, FILM COATED ORAL EVERY 6 HOURS PRN
Status: DISCONTINUED | OUTPATIENT
Start: 2020-02-19 | End: 2020-02-22 | Stop reason: HOSPADM

## 2020-02-19 RX ORDER — DIPHENHYDRAMINE HCL 25 MG
25 CAPSULE ORAL EVERY 4 HOURS PRN
Status: DISCONTINUED | OUTPATIENT
Start: 2020-02-19 | End: 2020-02-22 | Stop reason: HOSPADM

## 2020-02-19 RX ADMIN — ONDANSETRON 4 MG: 2 INJECTION INTRAMUSCULAR; INTRAVENOUS at 07:38

## 2020-02-19 RX ADMIN — SIMETHICONE CHEW TAB 80 MG 80 MG: 80 TABLET ORAL at 21:16

## 2020-02-19 RX ADMIN — MORPHINE SULFATE 150 MCG: 0.5 INJECTION, SOLUTION EPIDURAL; INTRATHECAL; INTRAVENOUS at 07:43

## 2020-02-19 RX ADMIN — BUPIVACAINE HYDROCHLORIDE IN DEXTROSE 1.6 ML: 7.5 INJECTION, SOLUTION SUBARACHNOID at 07:43

## 2020-02-19 RX ADMIN — SODIUM CHLORIDE, POTASSIUM CHLORIDE, SODIUM LACTATE AND CALCIUM CHLORIDE: 600; 310; 30; 20 INJECTION, SOLUTION INTRAVENOUS at 08:01

## 2020-02-19 RX ADMIN — Medication: at 11:21

## 2020-02-19 RX ADMIN — IBUPROFEN 600 MG: 600 TABLET ORAL at 15:41

## 2020-02-19 RX ADMIN — METOCLOPRAMIDE 10 MG: 5 INJECTION, SOLUTION INTRAMUSCULAR; INTRAVENOUS at 07:54

## 2020-02-19 RX ADMIN — PHENYLEPHRINE HYDROCHLORIDE 100 MCG: 10 INJECTION, SOLUTION INTRAMUSCULAR; INTRAVENOUS; SUBCUTANEOUS at 07:57

## 2020-02-19 RX ADMIN — SODIUM CHLORIDE, POTASSIUM CHLORIDE, SODIUM LACTATE AND CALCIUM CHLORIDE 125 ML/HR: 600; 310; 30; 20 INJECTION, SOLUTION INTRAVENOUS at 07:11

## 2020-02-19 RX ADMIN — FENTANYL CITRATE 15 MCG: 50 INJECTION, SOLUTION INTRAMUSCULAR; INTRAVENOUS at 07:43

## 2020-02-19 RX ADMIN — SODIUM CHLORIDE, POTASSIUM CHLORIDE, SODIUM LACTATE AND CALCIUM CHLORIDE 1000 ML: 600; 310; 30; 20 INJECTION, SOLUTION INTRAVENOUS at 06:45

## 2020-02-19 RX ADMIN — PHENYLEPHRINE HYDROCHLORIDE 100 MCG: 10 INJECTION, SOLUTION INTRAMUSCULAR; INTRAVENOUS; SUBCUTANEOUS at 07:50

## 2020-02-19 RX ADMIN — HYDROCODONE BITARTRATE AND ACETAMINOPHEN 1 TABLET: 5; 325 TABLET ORAL at 15:41

## 2020-02-19 RX ADMIN — MIDAZOLAM 1 MG: 1 INJECTION INTRAMUSCULAR; INTRAVENOUS at 07:38

## 2020-02-19 RX ADMIN — IBUPROFEN 600 MG: 600 TABLET ORAL at 10:10

## 2020-02-19 RX ADMIN — HYDROCODONE BITARTRATE AND ACETAMINOPHEN 1 TABLET: 5; 325 TABLET ORAL at 11:21

## 2020-02-19 RX ADMIN — HYDROCODONE BITARTRATE AND ACETAMINOPHEN 1 TABLET: 5; 325 TABLET ORAL at 21:16

## 2020-02-19 RX ADMIN — IBUPROFEN 600 MG: 600 TABLET ORAL at 21:16

## 2020-02-19 RX ADMIN — FAMOTIDINE 20 MG: 10 INJECTION, SOLUTION INTRAVENOUS at 07:38

## 2020-02-19 RX ADMIN — DEXTROSE MONOHYDRATE 2 G: 50 INJECTION, SOLUTION INTRAVENOUS at 07:32

## 2020-02-19 RX ADMIN — FENTANYL CITRATE 25 MCG: 50 INJECTION, SOLUTION INTRAMUSCULAR; INTRAVENOUS at 08:31

## 2020-02-19 RX ADMIN — SODIUM CITRATE AND CITRIC ACID MONOHYDRATE 30 ML: 500; 334 SOLUTION ORAL at 07:33

## 2020-02-19 RX ADMIN — DOCUSATE SODIUM 100 MG: 100 CAPSULE, LIQUID FILLED ORAL at 21:16

## 2020-02-19 RX ADMIN — SIMETHICONE CHEW TAB 80 MG 80 MG: 80 TABLET ORAL at 18:17

## 2020-02-19 RX ADMIN — OXYTOCIN 500 ML: 10 INJECTION INTRAVENOUS at 08:03

## 2020-02-19 RX ADMIN — FENTANYL CITRATE 35 MCG: 50 INJECTION, SOLUTION INTRAMUSCULAR; INTRAVENOUS at 08:10

## 2020-02-19 RX ADMIN — OXYTOCIN 100 ML: 10 INJECTION INTRAVENOUS at 08:43

## 2020-02-19 RX ADMIN — SIMETHICONE CHEW TAB 80 MG 80 MG: 80 TABLET ORAL at 11:21

## 2020-02-19 NOTE — ANESTHESIA PROCEDURE NOTES
Spinal Block      Patient reassessed immediately prior to procedure    Patient location during procedure: OR  Indication:at surgeon's request  Performed By  CRNA: Tete Smith CRNA  Preanesthetic Checklist  Completed: patient identified, site marked, surgical consent, pre-op evaluation, timeout performed, IV checked, risks and benefits discussed and monitors and equipment checked  Spinal Block Prep:  Patient Position:sitting  Sterile Tech:cap, gloves, mask and sterile barriers  Prep:Betadine  Patient Monitoring:blood pressure monitoring, continuous pulse oximetry and EKG  Spinal Block Procedure  Approach:midline  Guidance:palpation technique  Location:L3-L4  Needle Type:Herson  Needle Gauge:25 G  Placement of Spinal needle event:cerebrospinal fluid aspirated  Paresthesia: no  Fluid Appearance:clear     Post Assessment  Patient Tolerance:patient tolerated the procedure well with no apparent complications  Complications no

## 2020-02-19 NOTE — ANESTHESIA PREPROCEDURE EVALUATION
Anesthesia Evaluation     Patient summary reviewed and Nursing notes reviewed   NPO Solid Status: > 8 hours  NPO Liquid Status: > 8 hours           Airway   Mallampati: II  TM distance: >3 FB  Neck ROM: full  Dental      Pulmonary - negative pulmonary ROS   Cardiovascular     (+) hypertension,       Neuro/Psych- negative ROS  GI/Hepatic/Renal/Endo - negative ROS     Musculoskeletal (-) negative ROS    Abdominal    Substance History - negative use     OB/GYN    (+) Pregnant, pregnancy induced hypertension        Other - negative ROS                       Anesthesia Plan    ASA 3     ITN and spinal       Anesthetic plan, all risks, benefits, and alternatives have been provided, discussed and informed consent has been obtained with: patient.

## 2020-02-19 NOTE — ANESTHESIA POSTPROCEDURE EVALUATION
Patient: Temitope Plata    Procedure Summary     Date:  20 Room / Location:   [facility]    Anesthesia Start:  736 Anesthesia Stop:  901    Procedure:   SECTION PRIMARY (N/A Abdomen) Diagnosis:      Surgeon:  Cristel Figueroa MD Provider:  Rolanda Andrade DO    Anesthesia Type:  ITN, spinal ASA Status:  3          Anesthesia Type: ITN, spinal    Vitals  Vitals Value Taken Time   BP     Temp     Pulse 94 2020  9:00 AM   Resp     SpO2 98 % 2020  9:00 AM   Vitals shown include unvalidated device data.    901   B/P 99/65  Sat 96%  RR 18  Temp 97.4  Pulse 79      Post Anesthesia Care and Evaluation    Patient location during evaluation: bedside  Patient participation: complete - patient participated  Level of consciousness: awake and alert  Pain management: adequate  Airway patency: patent  Anesthetic complications: No anesthetic complications    Cardiovascular status: acceptable  Respiratory status: acceptable  Hydration status: acceptable

## 2020-02-20 LAB
BASOPHILS # BLD AUTO: 0.05 10*3/MM3 (ref 0–0.2)
BASOPHILS NFR BLD AUTO: 0.4 % (ref 0–1.5)
DEPRECATED RDW RBC AUTO: 42.5 FL (ref 37–54)
EOSINOPHIL # BLD AUTO: 0.11 10*3/MM3 (ref 0–0.4)
EOSINOPHIL NFR BLD AUTO: 0.8 % (ref 0.3–6.2)
ERYTHROCYTE [DISTWIDTH] IN BLOOD BY AUTOMATED COUNT: 12.3 % (ref 12.3–15.4)
HCT VFR BLD AUTO: 34.7 % (ref 34–46.6)
HGB BLD-MCNC: 11.3 G/DL (ref 12–15.9)
IMM GRANULOCYTES # BLD AUTO: 0.07 10*3/MM3 (ref 0–0.05)
IMM GRANULOCYTES NFR BLD AUTO: 0.5 % (ref 0–0.5)
LYMPHOCYTES # BLD AUTO: 1.13 10*3/MM3 (ref 0.7–3.1)
LYMPHOCYTES NFR BLD AUTO: 8.6 % (ref 19.6–45.3)
MCH RBC QN AUTO: 30.5 PG (ref 26.6–33)
MCHC RBC AUTO-ENTMCNC: 32.6 G/DL (ref 31.5–35.7)
MCV RBC AUTO: 93.5 FL (ref 79–97)
MONOCYTES # BLD AUTO: 1 10*3/MM3 (ref 0.1–0.9)
MONOCYTES NFR BLD AUTO: 7.6 % (ref 5–12)
NEUTROPHILS # BLD AUTO: 10.78 10*3/MM3 (ref 1.7–7)
NEUTROPHILS NFR BLD AUTO: 82.1 % (ref 42.7–76)
NRBC BLD AUTO-RTO: 0 /100 WBC (ref 0–0.2)
PLATELET # BLD AUTO: 165 10*3/MM3 (ref 140–450)
PMV BLD AUTO: 11.1 FL (ref 6–12)
RBC # BLD AUTO: 3.71 10*6/MM3 (ref 3.77–5.28)
WBC NRBC COR # BLD: 13.14 10*3/MM3 (ref 3.4–10.8)

## 2020-02-20 PROCEDURE — 85025 COMPLETE CBC W/AUTO DIFF WBC: CPT | Performed by: OBSTETRICS & GYNECOLOGY

## 2020-02-20 RX ADMIN — SIMETHICONE CHEW TAB 80 MG 80 MG: 80 TABLET ORAL at 16:15

## 2020-02-20 RX ADMIN — IBUPROFEN 600 MG: 600 TABLET ORAL at 02:59

## 2020-02-20 RX ADMIN — SIMETHICONE CHEW TAB 80 MG 80 MG: 80 TABLET ORAL at 07:39

## 2020-02-20 RX ADMIN — HYDROCODONE BITARTRATE AND ACETAMINOPHEN 1 TABLET: 5; 325 TABLET ORAL at 02:59

## 2020-02-20 RX ADMIN — HYDROCODONE BITARTRATE AND ACETAMINOPHEN 1 TABLET: 5; 325 TABLET ORAL at 07:39

## 2020-02-20 RX ADMIN — PRENATAL VIT W/ FE FUMARATE-FA TAB 27-0.8 MG 1 TABLET: 27-0.8 TAB at 07:39

## 2020-02-20 RX ADMIN — DOCUSATE SODIUM 100 MG: 100 CAPSULE, LIQUID FILLED ORAL at 20:25

## 2020-02-20 RX ADMIN — HYDROCODONE BITARTRATE AND ACETAMINOPHEN 1 TABLET: 10; 325 TABLET ORAL at 16:14

## 2020-02-20 RX ADMIN — HYDROCODONE BITARTRATE AND ACETAMINOPHEN 1 TABLET: 10; 325 TABLET ORAL at 20:26

## 2020-02-20 RX ADMIN — SIMETHICONE CHEW TAB 80 MG 80 MG: 80 TABLET ORAL at 12:16

## 2020-02-20 RX ADMIN — IBUPROFEN 600 MG: 600 TABLET ORAL at 15:04

## 2020-02-20 RX ADMIN — HYDROCODONE BITARTRATE AND ACETAMINOPHEN 1 TABLET: 5; 325 TABLET ORAL at 12:16

## 2020-02-20 RX ADMIN — IBUPROFEN 600 MG: 600 TABLET ORAL at 09:43

## 2020-02-20 RX ADMIN — DOCUSATE SODIUM 100 MG: 100 CAPSULE, LIQUID FILLED ORAL at 07:39

## 2020-02-20 RX ADMIN — SIMETHICONE CHEW TAB 80 MG 80 MG: 80 TABLET ORAL at 20:26

## 2020-02-20 RX ADMIN — IBUPROFEN 600 MG: 600 TABLET ORAL at 22:24

## 2020-02-21 LAB
CYTO UR: NORMAL
LAB AP CASE REPORT: NORMAL
LAB AP CLINICAL INFORMATION: NORMAL
PATH REPORT.FINAL DX SPEC: NORMAL
PATH REPORT.GROSS SPEC: NORMAL

## 2020-02-21 RX ORDER — OXYCODONE AND ACETAMINOPHEN 10; 325 MG/1; MG/1
1 TABLET ORAL EVERY 4 HOURS PRN
Status: DISCONTINUED | OUTPATIENT
Start: 2020-02-21 | End: 2020-02-22 | Stop reason: HOSPADM

## 2020-02-21 RX ORDER — OXYCODONE HYDROCHLORIDE AND ACETAMINOPHEN 5; 325 MG/1; MG/1
1 TABLET ORAL EVERY 4 HOURS PRN
Status: DISCONTINUED | OUTPATIENT
Start: 2020-02-21 | End: 2020-02-22 | Stop reason: HOSPADM

## 2020-02-21 RX ADMIN — SIMETHICONE CHEW TAB 80 MG 80 MG: 80 TABLET ORAL at 19:50

## 2020-02-21 RX ADMIN — SIMETHICONE CHEW TAB 80 MG 80 MG: 80 TABLET ORAL at 10:16

## 2020-02-21 RX ADMIN — IBUPROFEN 600 MG: 600 TABLET ORAL at 13:26

## 2020-02-21 RX ADMIN — OXYCODONE HYDROCHLORIDE AND ACETAMINOPHEN 1 TABLET: 10; 325 TABLET ORAL at 01:13

## 2020-02-21 RX ADMIN — IBUPROFEN 600 MG: 600 TABLET ORAL at 05:46

## 2020-02-21 RX ADMIN — DOCUSATE SODIUM 100 MG: 100 CAPSULE, LIQUID FILLED ORAL at 19:51

## 2020-02-21 RX ADMIN — OXYCODONE HYDROCHLORIDE AND ACETAMINOPHEN 1 TABLET: 10; 325 TABLET ORAL at 10:17

## 2020-02-21 RX ADMIN — OXYCODONE HYDROCHLORIDE AND ACETAMINOPHEN 1 TABLET: 5; 325 TABLET ORAL at 14:58

## 2020-02-21 RX ADMIN — SIMETHICONE CHEW TAB 80 MG 80 MG: 80 TABLET ORAL at 13:26

## 2020-02-21 RX ADMIN — IBUPROFEN 600 MG: 600 TABLET ORAL at 19:51

## 2020-02-21 RX ADMIN — DOCUSATE SODIUM 100 MG: 100 CAPSULE, LIQUID FILLED ORAL at 10:16

## 2020-02-21 RX ADMIN — OXYCODONE HYDROCHLORIDE AND ACETAMINOPHEN 1 TABLET: 5; 325 TABLET ORAL at 19:51

## 2020-02-21 RX ADMIN — OXYCODONE HYDROCHLORIDE AND ACETAMINOPHEN 1 TABLET: 10; 325 TABLET ORAL at 05:46

## 2020-02-22 VITALS
SYSTOLIC BLOOD PRESSURE: 104 MMHG | OXYGEN SATURATION: 100 % | TEMPERATURE: 98.6 F | HEART RATE: 79 BPM | RESPIRATION RATE: 16 BRPM | DIASTOLIC BLOOD PRESSURE: 70 MMHG

## 2020-02-22 PROCEDURE — 90707 MMR VACCINE SC: CPT | Performed by: ADVANCED PRACTICE MIDWIFE

## 2020-02-22 PROCEDURE — 25010000002 MEASLES, MUMPS & RUBELLA VAC RECONSTITUTED SOLUTION: Performed by: ADVANCED PRACTICE MIDWIFE

## 2020-02-22 PROCEDURE — 90471 IMMUNIZATION ADMIN: CPT | Performed by: ADVANCED PRACTICE MIDWIFE

## 2020-02-22 RX ORDER — IBUPROFEN 600 MG/1
600 TABLET ORAL EVERY 6 HOURS PRN
Qty: 60 TABLET | Refills: 0 | Status: SHIPPED | OUTPATIENT
Start: 2020-02-22 | End: 2022-05-25

## 2020-02-22 RX ORDER — OXYCODONE HYDROCHLORIDE AND ACETAMINOPHEN 5; 325 MG/1; MG/1
1-2 TABLET ORAL EVERY 4 HOURS PRN
Qty: 20 TABLET | Refills: 0 | Status: SHIPPED | OUTPATIENT
Start: 2020-02-22 | End: 2022-05-25

## 2020-02-22 RX ORDER — LANOLIN
CREAM (ML) TOPICAL
Start: 2020-02-22 | End: 2022-05-25

## 2020-02-22 RX ORDER — PSEUDOEPHEDRINE HCL 30 MG
100 TABLET ORAL 2 TIMES DAILY PRN
Start: 2020-02-22 | End: 2022-05-25

## 2020-02-22 RX ORDER — SIMETHICONE 80 MG
80 TABLET,CHEWABLE ORAL 4 TIMES DAILY PRN
Start: 2020-02-22 | End: 2022-05-25

## 2020-02-22 RX ADMIN — SIMETHICONE CHEW TAB 80 MG 80 MG: 80 TABLET ORAL at 11:56

## 2020-02-22 RX ADMIN — SIMETHICONE CHEW TAB 80 MG 80 MG: 80 TABLET ORAL at 09:57

## 2020-02-22 RX ADMIN — OXYCODONE HYDROCHLORIDE AND ACETAMINOPHEN 1 TABLET: 5; 325 TABLET ORAL at 09:57

## 2020-02-22 RX ADMIN — OXYCODONE HYDROCHLORIDE AND ACETAMINOPHEN 1 TABLET: 5; 325 TABLET ORAL at 00:11

## 2020-02-22 RX ADMIN — DOCUSATE SODIUM 100 MG: 100 CAPSULE, LIQUID FILLED ORAL at 09:57

## 2020-02-22 RX ADMIN — MEASLES, MUMPS, AND RUBELLA VIRUS VACCINE LIVE 0.5 ML: 1000; 12500; 1000 INJECTION, POWDER, LYOPHILIZED, FOR SUSPENSION SUBCUTANEOUS at 13:12

## 2020-02-22 RX ADMIN — OXYCODONE HYDROCHLORIDE AND ACETAMINOPHEN 1 TABLET: 5; 325 TABLET ORAL at 05:10

## 2020-02-22 RX ADMIN — IBUPROFEN 600 MG: 600 TABLET ORAL at 05:10

## 2020-02-22 RX ADMIN — IBUPROFEN 600 MG: 600 TABLET ORAL at 11:56

## 2020-06-08 PROCEDURE — U0003 INFECTIOUS AGENT DETECTION BY NUCLEIC ACID (DNA OR RNA); SEVERE ACUTE RESPIRATORY SYNDROME CORONAVIRUS 2 (SARS-COV-2) (CORONAVIRUS DISEASE [COVID-19]), AMPLIFIED PROBE TECHNIQUE, MAKING USE OF HIGH THROUGHPUT TECHNOLOGIES AS DESCRIBED BY CMS-2020-01-R: HCPCS | Performed by: NURSE PRACTITIONER

## 2020-06-11 ENCOUNTER — TELEPHONE (OUTPATIENT)
Dept: URGENT CARE | Facility: CLINIC | Age: 35
End: 2020-06-11

## 2020-06-11 NOTE — TELEPHONE ENCOUNTER
Spoke with Pt informed lab result was not detected. Pt verbalized understanding stated she is still having cough and sore throat. Informed Pt if symptoms still persists to follow up with PCP for further evaluation. Informed Pt the CDC still recommends to stay in quarantine for 10 days from onset of symptoms and to be fever free for 72 hours without tylenol or motrin before returning back to work. Pt verbalized understanding no further questions.

## 2022-05-25 ENCOUNTER — OFFICE VISIT (OUTPATIENT)
Dept: INTERNAL MEDICINE | Facility: CLINIC | Age: 37
End: 2022-05-25

## 2022-05-25 ENCOUNTER — LAB (OUTPATIENT)
Dept: LAB | Facility: HOSPITAL | Age: 37
End: 2022-05-25

## 2022-05-25 VITALS
OXYGEN SATURATION: 99 % | DIASTOLIC BLOOD PRESSURE: 80 MMHG | HEART RATE: 102 BPM | BODY MASS INDEX: 28.92 KG/M2 | WEIGHT: 169.4 LBS | HEIGHT: 64 IN | SYSTOLIC BLOOD PRESSURE: 102 MMHG | TEMPERATURE: 98.4 F

## 2022-05-25 DIAGNOSIS — F43.9 SITUATIONAL STRESS: ICD-10-CM

## 2022-05-25 DIAGNOSIS — Z80.9 FAMILY HISTORY OF CANCER IN FATHER: ICD-10-CM

## 2022-05-25 DIAGNOSIS — Z83.49 FAMILY HISTORY OF HASHIMOTO THYROIDITIS: ICD-10-CM

## 2022-05-25 DIAGNOSIS — Z00.00 ROUTINE PHYSICAL EXAMINATION: ICD-10-CM

## 2022-05-25 DIAGNOSIS — Z00.00 ROUTINE PHYSICAL EXAMINATION: Primary | ICD-10-CM

## 2022-05-25 DIAGNOSIS — Z80.7 FAMILY HISTORY OF LYMPHOMA: ICD-10-CM

## 2022-05-25 DIAGNOSIS — E55.9 VITAMIN D DEFICIENCY: ICD-10-CM

## 2022-05-25 PROBLEM — Z34.90 PREGNANCY: Status: RESOLVED | Noted: 2020-02-13 | Resolved: 2022-05-25

## 2022-05-25 PROBLEM — O13.9 GESTATIONAL HYPERTENSION WITHOUT SIGNIFICANT PROTEINURIA: Status: RESOLVED | Noted: 2020-02-06 | Resolved: 2022-05-25

## 2022-05-25 PROBLEM — Z34.93 PREGNANT AND NOT YET DELIVERED IN THIRD TRIMESTER: Status: RESOLVED | Noted: 2020-02-06 | Resolved: 2022-05-25

## 2022-05-25 LAB
25(OH)D3 SERPL-MCNC: 20.3 NG/ML (ref 30–100)
ALBUMIN SERPL-MCNC: 4.6 G/DL (ref 3.5–5.2)
ALBUMIN/GLOB SERPL: 1.6 G/DL
ALP SERPL-CCNC: 66 U/L (ref 39–117)
ALT SERPL W P-5'-P-CCNC: 16 U/L (ref 1–33)
ANION GAP SERPL CALCULATED.3IONS-SCNC: 9.6 MMOL/L (ref 5–15)
AST SERPL-CCNC: 18 U/L (ref 1–32)
BILIRUB SERPL-MCNC: 0.5 MG/DL (ref 0–1.2)
BUN SERPL-MCNC: 10 MG/DL (ref 6–20)
BUN/CREAT SERPL: 13.5 (ref 7–25)
CALCIUM SPEC-SCNC: 9.6 MG/DL (ref 8.6–10.5)
CHLORIDE SERPL-SCNC: 106 MMOL/L (ref 98–107)
CHOLEST SERPL-MCNC: 182 MG/DL (ref 0–200)
CO2 SERPL-SCNC: 24.4 MMOL/L (ref 22–29)
CREAT SERPL-MCNC: 0.74 MG/DL (ref 0.57–1)
DEPRECATED RDW RBC AUTO: 40.2 FL (ref 37–54)
EGFRCR SERPLBLD CKD-EPI 2021: 107.7 ML/MIN/1.73
ERYTHROCYTE [DISTWIDTH] IN BLOOD BY AUTOMATED COUNT: 11.7 % (ref 12.3–15.4)
FERRITIN SERPL-MCNC: 57 NG/ML (ref 13–150)
GLOBULIN UR ELPH-MCNC: 2.8 GM/DL
GLUCOSE SERPL-MCNC: 79 MG/DL (ref 65–99)
HCT VFR BLD AUTO: 46.7 % (ref 34–46.6)
HDLC SERPL-MCNC: 78 MG/DL (ref 40–60)
HGB BLD-MCNC: 15.3 G/DL (ref 12–15.9)
IRON 24H UR-MRATE: 151 MCG/DL (ref 37–145)
IRON SATN MFR SERPL: 39 % (ref 20–50)
LDLC SERPL CALC-MCNC: 95 MG/DL (ref 0–100)
LDLC/HDLC SERPL: 1.22 {RATIO}
MCH RBC QN AUTO: 30.8 PG (ref 26.6–33)
MCHC RBC AUTO-ENTMCNC: 32.8 G/DL (ref 31.5–35.7)
MCV RBC AUTO: 94 FL (ref 79–97)
PLATELET # BLD AUTO: 211 10*3/MM3 (ref 140–450)
PMV BLD AUTO: 11.6 FL (ref 6–12)
POTASSIUM SERPL-SCNC: 4 MMOL/L (ref 3.5–5.2)
PROT SERPL-MCNC: 7.4 G/DL (ref 6–8.5)
RBC # BLD AUTO: 4.97 10*6/MM3 (ref 3.77–5.28)
SODIUM SERPL-SCNC: 140 MMOL/L (ref 136–145)
T4 FREE SERPL-MCNC: 1.29 NG/DL (ref 0.93–1.7)
TIBC SERPL-MCNC: 392 MCG/DL (ref 298–536)
TRANSFERRIN SERPL-MCNC: 263 MG/DL (ref 200–360)
TRIGL SERPL-MCNC: 46 MG/DL (ref 0–150)
TSH SERPL DL<=0.05 MIU/L-ACNC: 1.05 UIU/ML (ref 0.27–4.2)
VIT B12 BLD-MCNC: 875 PG/ML (ref 211–946)
VLDLC SERPL-MCNC: 9 MG/DL (ref 5–40)
WBC NRBC COR # BLD: 7.03 10*3/MM3 (ref 3.4–10.8)

## 2022-05-25 PROCEDURE — 80050 GENERAL HEALTH PANEL: CPT

## 2022-05-25 PROCEDURE — 82607 VITAMIN B-12: CPT

## 2022-05-25 PROCEDURE — 84466 ASSAY OF TRANSFERRIN: CPT

## 2022-05-25 PROCEDURE — 99395 PREV VISIT EST AGE 18-39: CPT | Performed by: INTERNAL MEDICINE

## 2022-05-25 PROCEDURE — 82728 ASSAY OF FERRITIN: CPT

## 2022-05-25 PROCEDURE — 82306 VITAMIN D 25 HYDROXY: CPT

## 2022-05-25 PROCEDURE — 36415 COLL VENOUS BLD VENIPUNCTURE: CPT

## 2022-05-25 PROCEDURE — 83540 ASSAY OF IRON: CPT

## 2022-05-25 PROCEDURE — 80061 LIPID PANEL: CPT

## 2022-05-25 PROCEDURE — 84439 ASSAY OF FREE THYROXINE: CPT

## 2022-05-25 PROCEDURE — 86376 MICROSOMAL ANTIBODY EACH: CPT

## 2022-05-25 RX ORDER — BUPROPION HYDROCHLORIDE 150 MG/1
TABLET ORAL
Qty: 60 TABLET | Refills: 5 | Status: SHIPPED | OUTPATIENT
Start: 2022-05-25 | End: 2022-09-28

## 2022-05-25 NOTE — PROGRESS NOTES
Chief Complaint   Patient presents with   • Establish Care   • family hx of thyroid disease   • Anxiety     Worse over the past 6-8 months       History of Present Illness  HM, Adult Female: The patient is being seen for a health maintenance and gynecology evaluation. The last health maintenance visit was 1 year(s) ago.   Social History: She is  with 2 daughters 6 and 2( Carmen and Giullermina). 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 She reports occ drinking alcohol. Denies any illicit drug use.  General Health: The patient's health is described as good. She has regular dental visits. She denies vision problems. She denies hearing loss. Immunizations status: up to date.   Lifestyle:. She consumes a diverse and healthy diet. She does not have any weight concerns. She exercises regularly. She denies tobacco. She denies alcohol use. She denies drug use.   Reproductive health:. She reports normal menses.   Screening: Cancer screening reviewed and current.   Dad  of Brain ca with lung and renal mets( ? Lung Ca with brain mets)  Mom had lymphoma [facility] in 2019  Metabolic screening reviewed and current.   Risk screening reviewed and current.     Has a family history of Hashimotos in 3 of her cousins.  Also has issues with anxiety and focus, filled a ADD questionnaire with significant ADD.  Building a new home, and is the . She is the POA for her uncle, ( aunt just passed)and he lives in [facility]. Checks on him every 2 weeks.  Mom had lymphoma in remission, but became active again 2 weeks ago( under the care of Dr. Patrick).    Review of Systems   Constitutional: Negative for chills and fatigue.   HENT: Negative for congestion, ear pain and sore throat.    Eyes: Negative for pain, redness and visual disturbance.   Respiratory: Negative for cough and shortness of breath.    Cardiovascular: Negative for chest pain, palpitations and leg swelling.    Gastrointestinal: Negative for abdominal pain, diarrhea and nausea.   Endocrine: Negative for cold intolerance and heat intolerance.   Genitourinary: Negative for flank pain and urgency.   Musculoskeletal: Negative for arthralgias and gait problem.   Skin: Negative for pallor and rash.   Neurological: Positive for light-headedness. Negative for dizziness, weakness and headaches.   Psychiatric/Behavioral: Negative for dysphoric mood and sleep disturbance. The patient is nervous/anxious.        Patient Active Problem List   Diagnosis   • S/P  section   • Family history of Hashimoto thyroiditis   • Family history of cancer in father   • Family history of lymphoma   • Family history of cancer in grandmother       Social History     Socioeconomic History   • Marital status:    Tobacco Use   • Smoking status: Never Smoker   • Smokeless tobacco: Never Used   • Tobacco comment: None   Vaping Use   • Vaping Use: Never used   Substance and Sexual Activity   • Alcohol use: Yes     Alcohol/week: 1.0 - 2.0 standard drink     Types: 1 - 2 Glasses of wine per week   • Drug use: Never   • Sexual activity: Yes     Partners: Male     Birth control/protection: Surgical     Comment: Salpingectomy, 2020       Current Outpatient Medications on File Prior to Visit   Medication Sig Dispense Refill   • [DISCONTINUED] docusate sodium 100 MG capsule Take 100 mg by mouth 2 (Two) Times a Day As Needed for Constipation.     • [DISCONTINUED] Emollient (LANOLIN) cream Apply  topically to the appropriate area as directed Every 1 (One) Hour As Needed for Dry Skin (nipple pain).     • [DISCONTINUED] hydrocortisone (ANUSOL-HC) 2.5 % rectal cream Insert 1 application into the rectum As Needed for Hemorrhoids.     • [DISCONTINUED] ibuprofen (ADVIL,MOTRIN) 600 MG tablet Take 1 tablet by mouth Every 6 (Six) Hours As Needed for Mild Pain . 60 tablet 0   • [DISCONTINUED] oxyCODONE-acetaminophen (PERCOCET) 5-325 MG per tablet Take 1-2  "tablets by mouth Every 4 (Four) Hours As Needed for Moderate Pain . 20 tablet 0   • [DISCONTINUED] PRENATAL GUMMY VITAMIN Chew 2 each Daily.     • [DISCONTINUED] simethicone (MYLICON) 80 MG chewable tablet Chew 1 tablet 4 (Four) Times a Day As Needed for Flatulence.     • [DISCONTINUED] witch hazel-glycerin (TUCKS) pad Apply 1 pad topically to the appropriate area as directed As Needed for Irritation or Hemorrhoids.  12     No current facility-administered medications on file prior to visit.       No Known Allergies    /80   Pulse 102   Temp 98.4 °F (36.9 °C)   Ht 162.6 cm (64\")   Wt 76.8 kg (169 lb 6.4 oz)   LMP 05/25/2022 (Exact Date)   SpO2 99% Comment: michaela  BMI 29.08 kg/m²            The following portions of the patient's history were reviewed and updated as appropriate: allergies, current medications, past family history, past medical history, past social history, past surgical history and problem list.    Physical Exam  Constitutional:       General: She is not in acute distress.     Appearance: Normal appearance.   HENT:      Head: Normocephalic and atraumatic.      Right Ear: Tympanic membrane and external ear normal.      Left Ear: Tympanic membrane and external ear normal.      Nose: Nose normal.      Mouth/Throat:      Mouth: Mucous membranes are moist.   Eyes:      General: No scleral icterus.  Neck:      Thyroid: Thyromegaly present.      Vascular: No carotid bruit.     Cardiovascular:      Rate and Rhythm: Normal rate and regular rhythm.      Pulses: Normal pulses.      Heart sounds: Normal heart sounds. No murmur heard.    No friction rub. No gallop.   Pulmonary:      Effort: Pulmonary effort is normal.      Breath sounds: Normal breath sounds. No rhonchi or rales.   Abdominal:      General: Bowel sounds are normal. There is no distension.      Palpations: Abdomen is soft.      Tenderness: There is no right CVA tenderness, left CVA tenderness, guarding or rebound.      Hernia: No hernia " is present.   Musculoskeletal:         General: No tenderness. Normal range of motion.      Cervical back: Normal range of motion.      Right lower leg: No edema.      Left lower leg: No edema.   Lymphadenopathy:      Cervical: No cervical adenopathy.   Skin:     General: Skin is warm.      Findings: No rash.   Neurological:      General: No focal deficit present.      Mental Status: She is alert and oriented to person, place, and time. Mental status is at baseline.      Cranial Nerves: No cranial nerve deficit.      Sensory: No sensory deficit.      Coordination: Coordination normal.      Gait: Gait normal.      Deep Tendon Reflexes: Reflexes normal.   Psychiatric:         Mood and Affect: Mood normal.         Behavior: Behavior normal.         Results for orders placed or performed during the hospital encounter of 06/08/20   COVID-19,[facility] ROUTINE, NP/OP SWAB IN TRANSPORT MEDIA OR ESWAB 72 HR TAT - Swab, Oropharynx    Specimen: Oropharynx; Swab   Result Value Ref Range    SARS-CoV-2, KRISTINE Not Detected Not Detected   COVID [facility] Priority - Swab, Oropharynx    Specimen: Oropharynx; Swab   Result Value Ref Range    COVID [facility] PRIORITY Comment    POCT Rapid Strep A    Specimen: Swab   Result Value Ref Range    Rapid Strep A Screen Negative Negative, VALID, INVALID, Not Performed    Internal Control Passed Passed    Lot Number [id number]     Expiration Date 2022/10/09        Diagnoses and all orders for this visit:    1. Routine physical examination (Primary)  -     CBC (No Diff); Future  -     Comprehensive Metabolic Panel; Future  -     TSH; Future  -     T4, Free; Future  -     Thyroid Peroxidase Antibody; Future  -     Vitamin B12; Future  -     Ferritin; Future  -     Iron Profile; Future  -     Lipid Panel; Future    2. Family history of Hashimoto thyroiditis  -     TSH; Future  -     T4, Free; Future  -     Thyroid Peroxidase Antibody; Future    3. Family history of cancer in father  -     Ambulatory  Referral to Genetic Counseling/Testing - [facility]    4. Family history of lymphoma  -     Ambulatory Referral to Genetic Counseling/Testing - [facility]    5. Situational stress  -     buPROPion XL (Wellbutrin XL) 150 MG 24 hr tablet; 1 PO daily x 1 week then 2 PO daily  Dispense: 60 tablet; Refill: 5    6. Vitamin D deficiency  -     Vitamin D 25 Hydroxy; Future          Health Maintenance   Topic Date Due   • TDAP/TD VACCINES (2 - Td or Tdap) 09/18/2017   • INFLUENZA VACCINE  08/01/2022   • PAP SMEAR  01/20/2023   • ANNUAL PHYSICAL  05/26/2023   • HEPATITIS C SCREENING  Completed   • COVID-19 Vaccine  Completed   • Pneumococcal Vaccine 0-64  Aged Out       Discussion/Summary  Impression: health maintenance visit. Currently, she eats an adequate diet and has an adequate exercise regimen.   Cervical cancer screening:Pap smear is current.   Breast cancer screening: mammogram is due at 40.   Colorectal cancer screening: colonoscopy is due at 45.  Osteoporosis screening: Bone mineral density test is not indicated .   CT low dose screen - not indicated  Screening lab work includes hemoglobin, glucose, lipid profile, thyroid function testing, 25-hydroxyvitamin D and urinalysis.   Immunizations are needed, immunizations will be given as outlined in the orders   Advice and education were given regarding cardiovascular risk reduction, healthy dietary habits, Seatbelt and helmet use and self skin examination.     Return in about 3 months (around 8/25/2022) for Recheck.      Electronically signed by:    Viridiana Pearson MD

## 2022-05-26 LAB — THYROPEROXIDASE AB SERPL-ACNC: <8 IU/ML (ref 0–34)

## 2022-05-27 ENCOUNTER — PATIENT ROUNDING (BHMG ONLY) (OUTPATIENT)
Dept: INTERNAL MEDICINE | Facility: CLINIC | Age: 37
End: 2022-05-27

## 2022-06-05 RX ORDER — ERGOCALCIFEROL 1.25 MG/1
50000 CAPSULE ORAL WEEKLY
Qty: 12 CAPSULE | Refills: 1 | Status: SHIPPED | OUTPATIENT
Start: 2022-06-05

## 2022-09-28 ENCOUNTER — OFFICE VISIT (OUTPATIENT)
Dept: INTERNAL MEDICINE | Facility: CLINIC | Age: 37
End: 2022-09-28

## 2022-09-28 VITALS
SYSTOLIC BLOOD PRESSURE: 110 MMHG | TEMPERATURE: 97.8 F | DIASTOLIC BLOOD PRESSURE: 70 MMHG | HEIGHT: 64 IN | HEART RATE: 85 BPM | WEIGHT: 167.6 LBS | OXYGEN SATURATION: 98 % | BODY MASS INDEX: 28.61 KG/M2

## 2022-09-28 DIAGNOSIS — Z23 NEED FOR INFLUENZA VACCINATION: ICD-10-CM

## 2022-09-28 DIAGNOSIS — J30.89 NON-SEASONAL ALLERGIC RHINITIS DUE TO OTHER ALLERGIC TRIGGER: ICD-10-CM

## 2022-09-28 DIAGNOSIS — Z23 NEED FOR VACCINATION: ICD-10-CM

## 2022-09-28 DIAGNOSIS — R42 DIZZINESS: Primary | ICD-10-CM

## 2022-09-28 DIAGNOSIS — H53.9 VISION CHANGES: ICD-10-CM

## 2022-09-28 DIAGNOSIS — F43.9 SITUATIONAL STRESS: ICD-10-CM

## 2022-09-28 PROCEDURE — 0124A COVID-19 (PFIZER) BIVALENT BOOSTER 12+YRS: CPT | Performed by: INTERNAL MEDICINE

## 2022-09-28 PROCEDURE — 90471 IMMUNIZATION ADMIN: CPT | Performed by: INTERNAL MEDICINE

## 2022-09-28 PROCEDURE — 99214 OFFICE O/P EST MOD 30 MIN: CPT | Performed by: INTERNAL MEDICINE

## 2022-09-28 PROCEDURE — 91312 COVID-19 (PFIZER) BIVALENT BOOSTER 12+YRS: CPT | Performed by: INTERNAL MEDICINE

## 2022-09-28 PROCEDURE — 90686 IIV4 VACC NO PRSV 0.5 ML IM: CPT | Performed by: INTERNAL MEDICINE

## 2022-09-28 RX ORDER — AZELASTINE HYDROCHLORIDE, FLUTICASONE PROPIONATE 137; 50 UG/1; UG/1
2 SPRAY, METERED NASAL 2 TIMES DAILY
Qty: 23 G | Refills: 3 | Status: SHIPPED | OUTPATIENT
Start: 2022-09-28

## 2022-09-28 RX ORDER — ESCITALOPRAM OXALATE 10 MG/1
10 TABLET ORAL DAILY
Qty: 30 TABLET | Refills: 5 | Status: SHIPPED | OUTPATIENT
Start: 2022-09-28

## 2022-09-28 RX ORDER — BUPROPION HYDROCHLORIDE 150 MG/1
TABLET ORAL
Qty: 60 TABLET | Refills: 5 | Status: CANCELLED | OUTPATIENT
Start: 2022-09-28

## 2022-10-25 ENCOUNTER — APPOINTMENT (OUTPATIENT)
Dept: CARDIOLOGY | Facility: HOSPITAL | Age: 37
End: 2022-10-25

## 2022-12-12 ENCOUNTER — PATIENT MESSAGE (OUTPATIENT)
Dept: INTERNAL MEDICINE | Facility: CLINIC | Age: 37
End: 2022-12-12

## 2022-12-12 ENCOUNTER — TELEPHONE (OUTPATIENT)
Dept: INTERNAL MEDICINE | Facility: CLINIC | Age: 37
End: 2022-12-12

## 2022-12-12 NOTE — TELEPHONE ENCOUNTER
Caller: Temitope Plata    Relationship: Self    Best call back number: [phone]    What medication are you requesting: EYE DROPS     What are your current symptoms: WATERING EYES, SWELLING, STICKY YELLOW DRAINAGE     How long have you been experiencing symptoms: THIS MORNING     Have you had these symptoms before:    [x] Yes  [] No    Have you been treated for these symptoms before:   [x] Yes  [] No    If a prescription is needed, what is your preferred pharmacy and phone number: [id number] - [place], [place] - [address] [facility] [address] [facility] [phone] [facility] [phone]      Additional notes: PATIENT HAS SENT A MESSAGE VIA MY CHART WITH PICTURES

## 2022-12-13 RX ORDER — TOBRAMYCIN 3 MG/ML
1 SOLUTION/ DROPS OPHTHALMIC
Qty: 5 ML | Refills: 0 | Status: SHIPPED | OUTPATIENT
Start: 2022-12-13

## 2022-12-13 NOTE — TELEPHONE ENCOUNTER
Joselyn Mcgrath MA 12/12/2022 1:26 PM EST      ----- Message -----  From: Temitope Plata  Sent: 12/12/2022 1:25 PM EST  To: [facility]  Subject: Pink eye prescription request     Hi Dr Pearson - I am needing either antibiotic drops or ointment to treat Pink Eye/conjunctivitis in my right eye. I’m not sure if you are able to do this since it hasn’t been a previously discussed issue, or if I need to go to urgent care? I am attaching photos also. I have constant eye watering, with a yellowish sticky drainage, pressure/pain at my orbital bone when pressing just under my eyebrow. All symptoms began this morning.   Thanks,  Temitope

## 2023-03-28 ENCOUNTER — TELEPHONE (OUTPATIENT)
Dept: GENETICS | Facility: HOSPITAL | Age: 38
End: 2023-03-28
Payer: COMMERCIAL

## 2023-03-28 NOTE — TELEPHONE ENCOUNTER
Called patient to remind her of genetic counseling appt scheduled for tomorrow and review family history. Patient stated this was not a good time and asked that I call back this afternoon to review this history.

## 2023-03-29 ENCOUNTER — LAB (OUTPATIENT)
Dept: LAB | Facility: HOSPITAL | Age: 38
End: 2023-03-29
Payer: COMMERCIAL

## 2023-03-29 ENCOUNTER — CLINICAL SUPPORT (OUTPATIENT)
Dept: GENETICS | Facility: HOSPITAL | Age: 38
End: 2023-03-29
Payer: COMMERCIAL

## 2023-03-29 DIAGNOSIS — Z80.49 FAMILY HISTORY OF UTERINE CANCER: ICD-10-CM

## 2023-03-29 DIAGNOSIS — Z13.79 GENETIC TESTING: Primary | ICD-10-CM

## 2023-03-29 DIAGNOSIS — Z80.51 FAMILY HISTORY OF KIDNEY CANCER: ICD-10-CM

## 2023-03-29 DIAGNOSIS — Z80.3 FAMILY HISTORY OF MALIGNANT NEOPLASM OF BREAST: ICD-10-CM

## 2023-03-29 DIAGNOSIS — Z80.7 FAMILY HISTORY OF LYMPHOMA: ICD-10-CM

## 2023-03-29 DIAGNOSIS — Z80.41 FAMILY HISTORY OF MALIGNANT NEOPLASM OF OVARY: ICD-10-CM

## 2023-03-29 PROCEDURE — 96040: CPT | Performed by: GENETIC COUNSELOR, MS

## 2023-03-29 NOTE — PROGRESS NOTES
Temitope Plata, a 37-year-old female, was seen for genetic counseling due to a family history of cancer. She was 13 years old at menarche and had her first child at 30. Her current cancer screening includes annual clinical breast exam. She has never had a mammogram or colonoscopy. She had one ovary removed at age 30 due to a large cyst, and she also had a bilateral salpingectomy at age 34. Ms. Plata was interested in discussing her risk for a hereditary cancer syndrome. Ms. Pltaa was interested in pursuing a multigene panel, and therefore the CancerNext-Expanded panel was ordered through [facility] which analyzes BRCA1/2 and 75 additional genes associated with an increased cancer risk. Results are expected in 2-3 weeks.    FAMILY HISTORY (see attached pedigree):   Mother:    Non-Hodgkin Lymphoma, 64  Mat. Grandmother:   Breast cancer; Uterine cancer, 40s; Ovarian cancer, 60s  Mat. Great Grandmother:  Breast cancer  Mat. 1st cousin once removed (x2): Breast cancer, 50s  Father:     Kidney cancer, 46 (metastatic to lung and brain)  Pat. 1st cousin (x3):   Thyroid cancer, late 40s (all had h/o Hashimoto’s thyroiditis)    We do not have medical records confirming the diagnoses in Ms. Plata’s family.    RISK ASSESSMENT:  Ms. Plata’s family history of cancer led to concern regarding a hereditary cancer syndrome.  She meets NCCN guidelines criteria for BRCA1/2 testing based on her maternal family history of breast and ovarian cancer. She also meets criteria for VHL testing given her paternal family history of kidney cancer. If genetic testing is negative, Ms. Plata’s management should be guided by family history-based risk assessment.     GENETIC COUNSELING: (30 minutes) We reviewed the family history information in detail.  Cases of cancer follow three general patterns: sporadic, familial, and hereditary.  While most cancer is sporadic, some cases appear to occur in family clusters.  These cases are said to be  familial and account for 10-20% of certain cancer cases.  Familial cases may be due to a combination of shared genes and environmental factors among family members.  In even fewer families, the cancer is said to be inherited, and the genes responsible for the cancer are known.      Family histories typical of hereditary cancer syndromes usually include multiple first- and second-degree relatives diagnosed with cancer types that define a syndrome.  These cases tend to be diagnosed at younger-than-expected ages and can be bilateral or multifocal.  The cancer in these families follows an autosomal dominant inheritance pattern, which indicates the likely presence of a mutation in a cancer susceptibility gene.  Children and siblings of an individual believed to carry this mutation have a 50% chance of inheriting that mutation, thereby inheriting the increased risk to develop cancer.  These mutations can be passed down from the maternal or the paternal lineage.    Based on Ms. Plata’s family history, we discussed that hereditary breast cancer accounts for approximately 5-10% of all cases of breast cancer.  A significant proportion of hereditary breast and ovarian cancer can be attributed to mutations in the BRCA1 and BRCA2 genes.  Mutations in these genes confer an increased risk for breast cancer, ovarian cancer, male breast cancer, prostate cancer, and pancreatic cancer. Women with a BRCA1 or BRCA2 mutation have up to an 87% lifetime risk of breast cancer and up to a 44% risk of ovarian cancer. These genes are not responsible for every case of hereditary breast cancer, and we discussed multigene panels that can evaluate BRCA1/2 and a number of additional cancer related genes simultaneously.  We also discussed Von Hippel Lindau (VHL) is one hereditary cancer syndrome that increases the risk for kidney cancer.    The standard approach to genetic testing is via a multigene panel.  Genes included on these panels have  varying degrees of risk associated, and management and screening guidelines vary based on the specific gene.  Hereditary cancer syndromes can demonstrate incomplete penetrance and variable expression within families. There are genes that are evaluated that have been more recently described, and there may be less data regarding the risks and therefore may not have established management guidelines at this time. Based on Ms. Plata’s family history and her desire to get more information regarding her personal risks she opted to pursue testing through a panel evaluating several other genes known to increase the risk for cancer.     GENETIC TESTING:  The risks, benefits and limitations of genetic testing and implications for clinical management following testing were reviewed. DNA test results can influence decisions regarding screening and prevention.  Genetic testing can have significant psychological implications for both individuals and families. Also discussed was the possibility of employment and insurance discrimination based on genetic test results and the federal and states laws that are in place to prevent this (MARCEL).         We discussed multigene panel testing, which would involve testing BRCA1/2 and an additional 75 genes associated with an increased cancer risk. The benefits and limitations of genetic testing were discussed and Ms. Plata decided to pursue testing of the genes via the panel. The implications of a positive or negative test result were discussed.  We also discussed the importance of testing on an affected relative.  In cases where an affected relative is not available for testing or not willing to pursue testing, it is appropriate to offer testing to an unaffected individual. We discussed the possibility that, in some cases, genetic test results may be ambiguous due to the identification of a genetic variant. These variants may or may not be associated with an increased cancer risk. With  multigene panel testing, it is not uncommon for a variant of uncertain significance (VUS) to be identified.  If a VUS is identified, testing family members is not recommended and screening recommendations are made based on the family history.  The laboratories that perform genetic testing work to reclassify the VUS and send out an amended report if and when a VUS is reclassified.  The majority of variant findings are ultimately reclassified to a negative result. Given her family history, a negative test result does not eliminate all cancer risk, although the risk would not be as high as it would with positive genetic testing.     PLAN:  Genetic testing via the CancerNext-Expanded panel through [facility] was ordered. Results are expected in 2-3 weeks. We will contact Ms. Plata with her results once they are received. She is welcome to call us in the meantime with any questions at [phone].       Cristel Up MS, [facility]  Licensed Certified Genetic Counselor

## 2023-04-26 ENCOUNTER — DOCUMENTATION (OUTPATIENT)
Dept: GENETICS | Facility: HOSPITAL | Age: 38
End: 2023-04-26
Payer: COMMERCIAL

## 2023-04-26 NOTE — PROGRESS NOTES
Temitope Plata, a 37-year-old female, was seen for genetic counseling due to a family history of cancer. She was 13 years old at menarche and had her first child at 30. Her current cancer screening includes annual clinical breast exam. She has never had a mammogram or colonoscopy. She had one ovary removed at age 30 due to a large cyst, and she also had a bilateral salpingectomy at age 34. Ms. Plata was interested in discussing her risk for a hereditary cancer syndrome. Ms. Plata was interested in pursuing a multigene panel, and therefore the CancerNext-Expanded panel was ordered through [employer] which analyzes BRCA1/2 and 75 additional genes associated with an increased cancer risk. Genetic testing was positive for a pathogenic mutation (c.5073dupA) in the BRCA2 gene. These results were discussed at length with Ms. Plata by telephone on 4/26/2023.    FAMILY HISTORY (see attached pedigree):   Mother:    Non-Hodgkin Lymphoma, 64  Mat. Grandmother:   Breast cancer; Uterine cancer, 40s; Ovarian cancer, 60s  Mat. Great Grandmother:  Breast cancer  Mat. 1st cousin once removed (x2): Breast cancer, 50s  Father:     Kidney cancer, 46 (metastatic to lung and brain)  Pat. 1st cousin (x3):   Thyroid cancer, late 40s (all had h/o Hashimoto's thyroiditis)    We do not have medical records confirming the diagnoses in Ms. Plata's family.    RISK ASSESSMENT:  Ms. Plata's family history of cancer led to concern regarding a hereditary cancer syndrome.  She meets NCCN guidelines criteria for BRCA1/2 testing based on her maternal family history of breast and ovarian cancer. She also meets criteria for VHL testing given her paternal family history of kidney cancer. If genetic testing is negative, Ms. Martis management should be guided by family history-based risk assessment.     GENETIC COUNSELING: We reviewed the family history information in detail.  Cases of cancer follow three general patterns: sporadic, familial, and  hereditary.  While most cancer is sporadic, some cases appear to occur in family clusters.  These cases are said to be familial and account for 10-20% of certain cancer cases.  Familial cases may be due to a combination of shared genes and environmental factors among family members.  In even fewer families, the cancer is said to be inherited, and the genes responsible for the cancer are known.      Family histories typical of hereditary cancer syndromes usually include multiple first- and second-degree relatives diagnosed with cancer types that define a syndrome.  These cases tend to be diagnosed at younger-than-expected ages and can be bilateral or multifocal.  The cancer in these families follows an autosomal dominant inheritance pattern, which indicates the likely presence of a mutation in a cancer susceptibility gene.  Children and siblings of an individual believed to carry this mutation have a 50% chance of inheriting that mutation, thereby inheriting the increased risk to develop cancer.  These mutations can be passed down from the maternal or the paternal lineage.    Based on Ms. Plata's family history, we discussed that hereditary breast cancer accounts for approximately 5-10% of all cases of breast cancer.  A significant proportion of hereditary breast and ovarian cancer can be attributed to mutations in the BRCA1 and BRCA2 genes.  Mutations in these genes confer an increased risk for breast cancer, ovarian cancer, male breast cancer, prostate cancer, and pancreatic cancer. Women with a BRCA1 or BRCA2 mutation have up to an 87% lifetime risk of breast cancer and up to a 44% risk of ovarian cancer. These genes are not responsible for every case of hereditary breast cancer, and we discussed multigene panels that can evaluate BRCA1/2 and a number of additional cancer related genes simultaneously.  We also discussed Von Hippel Lindau (VHL) is one hereditary cancer syndrome that increases the risk for kidney  cancer.    The standard approach to genetic testing is via a multigene panel.  Genes included on these panels have varying degrees of risk associated, and management and screening guidelines vary based on the specific gene.  Hereditary cancer syndromes can demonstrate incomplete penetrance and variable expression within families. There are genes that are evaluated that have been more recently described, and there may be less data regarding the risks and therefore may not have established management guidelines at this time. Based on Ms. Plata's family history and her desire to get more information regarding her personal risks she opted to pursue testing through a panel evaluating several other genes known to increase the risk for cancer.     GENETIC TESTING:  The risks, benefits and limitations of genetic testing and implications for clinical management following testing were reviewed. DNA test results can influence decisions regarding screening and prevention.  Genetic testing can have significant psychological implications for both individuals and families. Also discussed was the possibility of employment and insurance discrimination based on genetic test results and the federal and states laws that are in place to prevent this (MARCEL).         We discussed multigene panel testing, which would involve testing BRCA1/2 and an additional 75 genes associated with an increased cancer risk. The benefits and limitations of genetic testing were discussed and Ms. Plata decided to pursue testing of the genes via the panel. The implications of a positive or negative test result were discussed.  We also discussed the importance of testing on an affected relative.  In cases where an affected relative is not available for testing or not willing to pursue testing, it is appropriate to offer testing to an unaffected individual. We discussed the possibility that, in some cases, genetic test results may be ambiguous due to the  identification of a genetic variant. These variants may or may not be associated with an increased cancer risk. With multigene panel testing, it is not uncommon for a variant of uncertain significance (VUS) to be identified.  If a VUS is identified, testing family members is not recommended and screening recommendations are made based on the family history.  The laboratories that perform genetic testing work to reclassify the VUS and send out an amended report if and when a VUS is reclassified.  The majority of variant findings are ultimately reclassified to a negative result. Given her family history, a negative test result does not eliminate all cancer risk, although the risk would not be as high as it would with positive genetic testing.     TEST RESULTS: Testing identified a pathogenic mutation in the BRCA2 gene (c.[id number]). This is causative of Hereditary Breast and Ovarian Cancer syndrome (HBOC). Genetic testing for the specific BRCA2 mutation is indicated for other family members to determine whether they have inherited this risk factor. Siblings and children of an individual who have a BRCA2 mutation are at a 50% chance to have inherited the familial BRCA2 mutation. Ms. Plata's mother could also be tested for this mutation. Testing is available to individuals age 18 or older. Ms. Plata would need to provide relatives with a copy of her genetic test results so that appropriate testing can be ordered for the specific familial mutation.     Until each at-risk family member has been proven not to carry this mutation in BRCA2, he or she should be offered increased surveillance. We would be happy to see family members in our clinic to further discuss this information and testing options. They can request a referral to [facility], and our coordinator will contact the individual to schedule an appointment once the referral is received. They can call [phone] to receive more information  on how to place the referral. For family members who live elsewhere, there are genetic counselors at most [facility]. They can find a genetic counselor by visiting the National Society of Genetic Counselors website at www.nsgc.org or they can call our office and we would be happy to give them the contact information of the closest genetic counselor.     Of note, testing identified a variant of uncertain significance (VUS) in the SMARCA4 gene and a VUS in the TSC1 gene.  A VUS is a finding that may or may not impact the function of the gene.  VUSs are not clinically actionable findings, and therefore does not impact management in any way at this time.  The majority of VUSs are ultimately reclassified to benign changes.  The identification of a VUS is common in multigene panel testing, given the number of genes being evaluated.  If a VUS is ever reclassified, a new report will be issued by the laboratory and released directly to the ordering physician and our office.      CANCER RISK: Mutations in BRCA2 confer a 56-84% lifetime risk of breast cancer, a 13-29% lifetime risk of ovarian cancer, and a 40-60% lifetime risk of a second breast cancer in someone who has already been diagnosed with breast cancer. Additionally, there is as high as a 7.1% risk for male breast cancer and a 19-61% risk for prostate cancer. BRCA2 mutations have also been associated with increased risks for other types of cancer, including pancreatic cancer (5-10% lifetime risk), and ocular and cutaneous melanoma (<5% lifetime risk), per current NCCN guidelines (NCCN 2023).     CANCER SCREENING:  Options available to individuals with a BRCA2 mutation and at high risk for breast and ovarian cancer were discussed, including increased surveillance, chemoprevention and prophylactic surgery (mastectomy and/or oophorectomy).      For women with a BRCA2 mutation who choose not to undergo risk-reducing bilateral mastectomy, increased breast  cancer surveillance is warranted. Increased surveillance, based on NCCN guidelines, would consist of semi-annual clinical breast exam and monthly self-breast exam starting at age 18, annual breast MRI starting at age 25, and annual mammography and breast MRI starting at age 30. For women with a BRCA2 mutation who have not had a breast cancer diagnosis, breast cancer chemoprevention is another option available. Studies have shown that Tamoxifen and Raloxifene can cut the risk of estrogen receptor positive breast cancer by 50% when taken by high-risk women. There are risks associated with these medications; therefore, the risks versus benefits must be considered prior to deciding to take chemopreventative medications.     Current data does not support routine ovarian cancer screening. We briefly discussed transvaginal ultrasound and serum CA-125, however neither has been found to be sufficiently sensitive or specific to be recommended for individuals that have an increased risk for ovarian cancer. Some physicians consider offering screening between ages 30-35, before a patient is at an age where BSO is typically offered.     Screening for males with BRCA2 mutations should include prostate cancer screening beginning at age 40, self-breast exam, and clinical breast exam beginning at 35. Mammography can be considered in males with gynecomastia. Given the risk for melanoma, annual skin and eye examinations should be considered for both males and females. The importance of monthly self-skin exams was emphasized, as well as diligence in following up with a clinician when a mole or suspicious skin lesion is identified.     There are no standardized screening tests that have been proven to be effective in early pancreatic cancer detection. In the absence of a family history of pancreatic cancer, there are not typically screening recommendations made. In cases where an individual has a BRCA2 mutation and family history of  pancreatic cancer some experts consider screening with endoscopic ultrasound, high-resolution pancreatic protocol CT, or MRI, starting at age 50 or 10 years younger than the earliest diagnosis of pancreas cancer in the family. Since these screening methods are not standard of care, consideration of referral to a clinical research screening program is appropriate if a patient wishes to pursue screening. Of note, Ms. Plata does not have a family history of pancreatic cancer.     SURGICAL OPTIONS:  Risk-reducing bilateral mastectomy has been shown to reduce the risk of breast cancer by approximately 90%.  Risk-reducing bilateral salpingo-oopherectomy (BSO) has been shown to reduce the risk of ovarian cancer by as much as 96%.  BSO is typically recommended by age 35-40 in women who have a BRCA mutation. Because the ovarian cancer onset in patients with a BRCA2 mutation is on average 8-10 years later than in patients with a BRCA1 mutation, it is reasonable to delay BSO until age 40-45 in BRCA2 patients unless the family history warrants earlier intervention (NCCN guidelines).  It has been suggested that risk-reducing BSO in high-risk women be done by a surgeon with experience in this population, such as a gynecologic oncologist.  Careful removal of the fallopian tubes is essential, due to the residual risk for fallopian tube cancer in BRCA positive patients. Additionally, 2-10% of BRCA positive patients are found to have ovarian cancer at the time of BSO; therefore, careful pathologic exam of the ovaries by serial sectioning is recommended.  In addition, cytologic evaluation of peritoneal washings could be considered.     PLAN: Genetic counseling remains available to Ms. Plata and her family. We discussed the availability of the [facility] ([facility] at [facility] for coordination of increased screening for the cancers associated with BRCA2 mutations. Ms. Plata is interested in being  followed in this clinic and she will be contacted to schedule an appointment. [employer] and Bright Pink are two online support resources that are available for women with a BRCA mutation. Ms. Plata is encouraged to call me should she have any questions at [phone] or if she would like to schedule an in-person follow-up appointment.        Cristel Up MS, [facility], [facility]  Licensed Certified Genetic Counselor      Cc: MD Cristel Vivas MD

## 2023-05-10 ENCOUNTER — LAB (OUTPATIENT)
Dept: LAB | Facility: HOSPITAL | Age: 38
End: 2023-05-10
Payer: COMMERCIAL

## 2023-05-10 ENCOUNTER — OFFICE VISIT (OUTPATIENT)
Dept: ONCOLOGY | Facility: CLINIC | Age: 38
End: 2023-05-10
Payer: COMMERCIAL

## 2023-05-10 VITALS
HEART RATE: 88 BPM | OXYGEN SATURATION: 96 % | SYSTOLIC BLOOD PRESSURE: 118 MMHG | TEMPERATURE: 97.4 F | DIASTOLIC BLOOD PRESSURE: 79 MMHG

## 2023-05-10 DIAGNOSIS — Z15.09 BRCA2 POSITIVE: Primary | ICD-10-CM

## 2023-05-10 DIAGNOSIS — Z12.31 BREAST CANCER SCREENING BY MAMMOGRAM: ICD-10-CM

## 2023-05-10 DIAGNOSIS — Z91.89 HIGH RISK OF OVARIAN CANCER: ICD-10-CM

## 2023-05-10 DIAGNOSIS — Z15.01 BRCA2 POSITIVE: ICD-10-CM

## 2023-05-10 DIAGNOSIS — Z15.01 BRCA2 POSITIVE: Primary | ICD-10-CM

## 2023-05-10 DIAGNOSIS — Z91.89 AT HIGH RISK FOR BREAST CANCER: ICD-10-CM

## 2023-05-10 DIAGNOSIS — Z15.09 BRCA2 POSITIVE: ICD-10-CM

## 2023-05-10 LAB — CANCER AG125 SERPL QL: 8.9 U/ML (ref 0–38.1)

## 2023-05-10 PROCEDURE — 86304 IMMUNOASSAY TUMOR CA 125: CPT

## 2023-05-10 PROCEDURE — 36415 COLL VENOUS BLD VENIPUNCTURE: CPT

## 2023-05-10 NOTE — PROGRESS NOTES
CANCER RISK MANAGEMENT CLINIC NEW PATIENT    Temitope Plata  [id number]  1985    Subjective   Chief Complaint: Establish Care (High risk BRCA2+)      HISTORY OF PRESENT ILLNESS:       Temitope Plata is a 37 y.o. year old female here today for her initial high risk visit. She recently underwent genetic counseling and testing due to a family history of cancer. Genetic testing returned positive for pathogenic mutation in the BRCA2 gene. She desires high risk screenings and was referred to the Cancer Risk Management Clinic.    Patient is a  who underwent menarche at age 12 and had her first child at age 30. She retains her uterus and at least one ovary. She underwent ovarian cystectomy in  and notes possible partial removal of that ovary at the time. She is s/p bilateral salpingectomy at the time of her last  section in  for permanent contraception and risk reduction. She used OCPs for only a very short time in her late teens, no hormonal medications since that time. She sees Dr. Cristel Figueroa for her routine well woman and gynecologic care. She is scheduled for annual exam with Dr. Figueroa later this month. She has a remote history of one abnormal pap smear years ago, work up and all negative since then. Last pap negative in .     Temitope performs regular home self breast exams, denies any new or concerning findings. She has never had a mammogram or breast MRI. She is interested in pursuing prophylactic mastectomies with or without reconstruction in the near future. She has never undergone TVUS or blood work for ovarian cancer screening. She is feeling generally well today. She denies abnormal vaginal bleeding (LMP approx 2023), pelvic pain, abdominal fullness/bloating, breast changes, or changes in bowel or bladder function.      The current medication list and allergy list were reviewed and reconciled.     Past Medical History:   Diagnosis Date   • Hemorrhoid    •  Ovarian cyst        Past Surgical History:   Procedure Laterality Date   •  SECTION N/A 2020    Procedure:  SECTION PRIMARY;  Surgeon: Cristel Figueroa MD;  Location: [facility] LABOR DELIVERY;  Service: Obstetrics/Gynecology;  Laterality: N/A;   • OVARIAN CYST REMOVAL     • SALPINGECTOMY Bilateral    • WISDOM TOOTH EXTRACTION         Family History   Problem Relation Age of Onset   • Lymphoma Mother 64        non-Hodgkins   • Kidney cancer Father 46        metastatic to brain and lung   • Uterine cancer Maternal Grandmother 45   • Breast cancer Maternal Grandmother 45   • Diabetes Maternal Grandmother    • Ovarian cancer Maternal Grandmother 62   • Other Paternal Grandmother         Hashimotos disease   • Breast cancer Maternal Cousin    • Breast cancer Maternal Cousin    • Hashimoto's thyroiditis Paternal Cousin    • Thyroid cancer Paternal Cousin    • Hashimoto's thyroiditis Paternal Cousin    • Thyroid cancer Paternal Cousin    • Hashimoto's thyroiditis Paternal Cousin          Health Maintenance:    Regular self breast exam: yes  Mammogram: NEVER.   Breast MRI: NEVER.   Ovarian Cancer screen: NEVER    Risk Asessment: Genetic Testing: BRCA2+      Review of Systems   Constitutional: Negative.    Gastrointestinal: Negative.    Genitourinary: Negative.    Breast; Negative      Objective   Physical Exam  Vital Signs: /79   Pulse 88   Temp 97.4 °F (36.3 °C) (Temporal)   LMP 2023 (Approximate)   SpO2 96%   Vitals:    05/10/23 1352   PainSc: 0-No pain           General Appearance:  alert, cooperative, no apparent distress and appears stated age   Neurologic/Psychiatric: A&O x 3, gait steady, appropriate affect   Breasts:  deferred   Skin: No rashes, ulcers, or suspicious lesions noted   Pelvic: deferred       Result Review :   The following data was reviewed by: ANNIE Mcnulty on 05/10/2023:  Data reviewed: genetic counseling notes, genetic testing results        Procedure Note:              Assessment and Plan:    Diagnoses and all orders for this visit:    1. BRCA2 positive (Primary)  -     Mammo Screening Digital Tomosynthesis Bilateral With CAD; Future  -     ; Future  -     US Non-ob Transvaginal; Future    2. At high risk for breast cancer  -     Mammo Screening Digital Tomosynthesis Bilateral With CAD; Future    3. Breast cancer screening by mammogram  -     Mammo Screening Digital Tomosynthesis Bilateral With CAD; Future    4. High risk of ovarian cancer  -     US Non-ob Transvaginal; Future        Patient Education:  Current NCCN guidelines for management of BRCA+ women:  Monthly breast exams starting at age 18.  Clinical breast exam every 6 months.  Annual mammogram starting at age 25, or earlier based upon family history.  Annual breast MRI starting at age 25.  Transvaginal ultrasound and CA-125 every 6 months starting at age 30, or 5-10 years before the earliest diagnosis in the family.   Consider risk-reducing salpingo-oophorectomy between ages 35 and 40 for BRCA1, between ages 40-45 for BRCA2.  Consider chemoprevention for breast cancer risk reduction (tamoxifen) and ovarian cancer (oral contraceptives).  Consider risk-reducing mastectomy.    Discussed the purpose of high risk clinic in coordinating, scheduling, and planning screening interventions for increased risk for breast and ovarian cancers. Colonoscopy and GYN screening per national guidelines.   Patient scheduled to see Dr. Figueroa in 2 weeks for complete annual well woman exam. In light of upcoming complete physical exam and baseline imaging studies, CBE and pelvic exam deferred today.   Reviewed technique for SBE including concerning findings to report.  Discussed twice yearly CBE, may be alternated between GYN and [facility].   Discussed yearly screening mammogram. Results will be reported by breast imaging center and a copy of report will be sent to our office for review. Baseline mammogram  ordered today. She will be called to schedule.  Discussed screening breast MRI scheduling based on menstrual cycles, HRT. We also discussed the increased sensitivity of MRI screening and possibility of call- backs for additional imaging or biopsies to establish baseline. Plan for MRI about 6 months after mammogram, unless surgical intervention before that time.  Discussed vitamin D as benefit to bone health and possible benefit to breast health. Recommended 1000 IU daily unless contraindicated  Discussed benefit of Vitamin E for fibrocystic breasts/breast pain  Discussed tamoxifen/evista use for chemoprevention including risk and benefits of use as well as side effects of medication-pt declines at this time  Discussed prophylactic surgeries including bilateral mastectomies and bilateral salpingo-oophrectomy. We discussed potential for risk reduction as well as risks of surgery. Patient is already s/p bilateral salpingectomy in 2020. Discussed recommendation for bilateral oophorectomy between ages 40-45. Until that time, we will plan for yearly TVUS and CA-125 (opting for every 12 months instead of every 6 months as she is s/p bilat salpingectomy for risk reduction).   The patient is very interested in pursuing risk reducing mastectomy this year. Reviewed risk reduction, general risks or surgery, and pros/cons of reconstruction. Patient is interested in referral to breast surgeon for further consideration and consultation. Referral ordered now. Recommended baseline mammogram prior to surgery, patient agreed. Additional imaging per surgeons recommendation.       Pain assessment was performed today as a part of patient’s care. For patients with pain related to surgery, gynecologic malignancy or cancer treatment, the plan is as noted in the assessment/plan.  For patients with pain not related to these issues, they are to seek any further needed care from a more appropriate provider, such as PCP.      I spent 45 minutes  caring for Temitope on this date of service. This time includes time spent by me in the following activities: preparing for the visit, reviewing tests, obtaining and/or reviewing a separately obtained history, counseling and educating the patient/family/caregiver, ordering medications, tests, or procedures, referring and communicating with other health care professionals, documenting information in the medical record and care coordination.           Return to clinic in 1 year for High Risk visit, pending breast surgery. Return in 6 months for clinical breast exam if no surgery before then.       Electronically signed by ANNIE Mcnulty on 05/10/23 at 14:48 EDT

## 2023-05-11 ENCOUNTER — TELEPHONE (OUTPATIENT)
Dept: GYNECOLOGIC ONCOLOGY | Facility: CLINIC | Age: 38
End: 2023-05-11
Payer: COMMERCIAL

## 2023-05-24 ENCOUNTER — TRANSCRIBE ORDERS (OUTPATIENT)
Dept: ADMINISTRATIVE | Facility: HOSPITAL | Age: 38
End: 2023-05-24
Payer: COMMERCIAL

## 2023-05-24 ENCOUNTER — OFFICE VISIT (OUTPATIENT)
Dept: INTERNAL MEDICINE | Facility: CLINIC | Age: 38
End: 2023-05-24
Payer: COMMERCIAL

## 2023-05-24 VITALS
WEIGHT: 171.2 LBS | DIASTOLIC BLOOD PRESSURE: 60 MMHG | HEIGHT: 64 IN | HEART RATE: 89 BPM | OXYGEN SATURATION: 98 % | BODY MASS INDEX: 29.23 KG/M2 | TEMPERATURE: 98.2 F | SYSTOLIC BLOOD PRESSURE: 122 MMHG

## 2023-05-24 DIAGNOSIS — E55.9 VITAMIN D DEFICIENCY: ICD-10-CM

## 2023-05-24 DIAGNOSIS — N63.10 MASS OF RIGHT BREAST, UNSPECIFIED QUADRANT: Primary | ICD-10-CM

## 2023-05-24 DIAGNOSIS — F51.02 ADJUSTMENT INSOMNIA: ICD-10-CM

## 2023-05-24 DIAGNOSIS — Z00.00 ROUTINE GENERAL MEDICAL EXAMINATION AT A HEALTH CARE FACILITY: Primary | ICD-10-CM

## 2023-05-24 DIAGNOSIS — R31.9 HEMATURIA, UNSPECIFIED TYPE: ICD-10-CM

## 2023-05-24 DIAGNOSIS — F43.9 SITUATIONAL STRESS: ICD-10-CM

## 2023-05-24 LAB
BILIRUB BLD-MCNC: NEGATIVE MG/DL
CLARITY, POC: CLEAR
COLOR UR: YELLOW
EXPIRATION DATE: ABNORMAL
GLUCOSE UR STRIP-MCNC: NEGATIVE MG/DL
KETONES UR QL: NEGATIVE
LEUKOCYTE EST, POC: NEGATIVE
Lab: ABNORMAL
NITRITE UR-MCNC: NEGATIVE MG/ML
PH UR: 6 [PH] (ref 5–8)
PROT UR STRIP-MCNC: NEGATIVE MG/DL
RBC # UR STRIP: ABNORMAL /UL
SP GR UR: 1.03 (ref 1–1.03)
UROBILINOGEN UR QL: NORMAL

## 2023-05-24 PROCEDURE — 99395 PREV VISIT EST AGE 18-39: CPT | Performed by: INTERNAL MEDICINE

## 2023-05-24 PROCEDURE — 87086 URINE CULTURE/COLONY COUNT: CPT | Performed by: INTERNAL MEDICINE

## 2023-05-24 PROCEDURE — 81003 URINALYSIS AUTO W/O SCOPE: CPT | Performed by: INTERNAL MEDICINE

## 2023-05-24 RX ORDER — ZOLPIDEM TARTRATE 5 MG/1
2.5-5 TABLET ORAL NIGHTLY PRN
Qty: 30 TABLET | Refills: 1 | Status: SHIPPED | OUTPATIENT
Start: 2023-05-24

## 2023-05-24 RX ORDER — DESVENLAFAXINE SUCCINATE 50 MG/1
50 TABLET, EXTENDED RELEASE ORAL DAILY
Qty: 30 TABLET | Refills: 5 | Status: SHIPPED | OUTPATIENT
Start: 2023-05-24

## 2023-05-24 RX ORDER — ESCITALOPRAM OXALATE 10 MG/1
10 TABLET ORAL DAILY
COMMUNITY
End: 2023-05-24

## 2023-05-24 RX ORDER — DESVENLAFAXINE 25 MG/1
25 TABLET, EXTENDED RELEASE ORAL DAILY
Qty: 7 TABLET | Refills: 0 | Status: SHIPPED | OUTPATIENT
Start: 2023-05-24

## 2023-05-24 NOTE — PROGRESS NOTES
Chief Complaint   Patient presents with   • Annual Exam     Having mastectomy   • Weight Gain     Has had a 10 lb gain since starting lexapro       History of Present Illness  HM, Adult Female:The patient is being seen for a health maintenance and gynecology evaluation. The last health maintenance visit was 1 year(s) ago.   Social History: She is  with 2 daughters 6 and 2( Carmen and Guillermina). 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 She reports occ drinking alcohol. Denies any illicit drug use.  General Health: The patient's health is described as good. She has regular dental visits. She denies vision problems. She denies hearing loss. Immunizations status: up to date.   Lifestyle:. She consumes a diverse and healthy diet. She does not have any weight concerns. She exercises regularly. She denies tobacco. She denies alcohol use. She denies drug use.   Reproductive health:. She reports normal menses.   Screening: Cancer screening reviewed and current.   Dad  of Brain ca with lung and renal mets( ? Lung Ca with brain mets)  Mom had lymphoma [facility] in 2019  Metabolic screening reviewed and current.   Risk screening reviewed and current.       [facility]  Genetics found she has the BRCA2., plan for b/l mastectomy soon, and has met Teto Bliss.    Review of Systems  The patient presents today for a physical examination.    The patient was referred for genetic counseling 1 year ago. She actually has the BRCA2 mutation. She is going to have a full mastectomy with Dr. Alphonse Irene. She meets with the breast surgeon the week after next. She had a salpingectomy when Guillermina was born. She will have to have her ovaries removed after she is 40-years-old. She saw gynecology this morning and she could upgrade the mammogram from a screening to a diagnostic. They may do another breast MRI depending on what is visualized and what they can if they need more detail. She has a screening  "mammogram, but her gynecologist is switching it to a diagnostic. She denies having a strong breast cancer history. Her aunt had breast cancer and her grandmother had ovarian cancer. Her father had kidney cancer.    She had a cyst on one of her ovaries that became significant in size.  She is not sleeping well. She sleeps 3 to 4 hours a night.    She is utilizing Lexapro. The patient is utilizing Viibryd for anxiety.       Patient Active Problem List   Diagnosis   • S/P  section   • Family history of Hashimoto thyroiditis   • Family history of cancer in father   • Family history of lymphoma   • Family history of cancer in grandmother       Social History     Socioeconomic History   • Marital status:    Tobacco Use   • Smoking status: Never   • Smokeless tobacco: Never   • Tobacco comments:     None   Vaping Use   • Vaping Use: Never used   Substance and Sexual Activity   • Alcohol use: Yes     Alcohol/week: 1.0 - 2.0 standard drink     Types: 1 - 2 Glasses of wine per week     Comment: or less   • Drug use: Never   • Sexual activity: Yes     Partners: Male     Birth control/protection: Surgical     Comment: Salpingectomy, 2020       No current outpatient medications on file prior to visit.     No current facility-administered medications on file prior to visit.       No Known Allergies    /60   Pulse 89   Temp 98.2 °F (36.8 °C)   Ht 162.6 cm (64\")   Wt 77.7 kg (171 lb 3.2 oz)   LMP 2023 (Approximate)   SpO2 98% Comment: ra  BMI 29.39 kg/m²            The following portions of the patient's history were reviewed and updated as appropriate: allergies, current medications, past family history, past medical history, past social history, past surgical history and problem list.    Physical Exam  Constitutional:       General: She is not in acute distress.     Appearance: Normal appearance.   HENT:      Head: Normocephalic and atraumatic.      Right Ear: Tympanic membrane and external " ear normal.      Left Ear: Tympanic membrane and external ear normal.      Nose: Nose normal.      Mouth/Throat:      Mouth: Mucous membranes are moist.   Eyes:      General: No scleral icterus.  Neck:      Vascular: No carotid bruit.   Cardiovascular:      Rate and Rhythm: Normal rate and regular rhythm.      Pulses: Normal pulses.      Heart sounds: Normal heart sounds. No murmur heard.    No friction rub. No gallop.   Pulmonary:      Effort: Pulmonary effort is normal.      Breath sounds: Normal breath sounds. No rhonchi or rales.   Abdominal:      General: Bowel sounds are normal. There is no distension.      Palpations: Abdomen is soft.      Tenderness: There is no right CVA tenderness, left CVA tenderness, guarding or rebound.      Hernia: No hernia is present.   Musculoskeletal:         General: No tenderness. Normal range of motion.      Cervical back: Normal range of motion.      Right lower leg: No edema.      Left lower leg: No edema.   Lymphadenopathy:      Cervical: No cervical adenopathy.   Skin:     General: Skin is warm.      Findings: No rash.   Neurological:      General: No focal deficit present.      Mental Status: She is alert and oriented to person, place, and time. Mental status is at baseline.      Cranial Nerves: No cranial nerve deficit.      Sensory: No sensory deficit.      Coordination: Coordination normal.      Gait: Gait normal.      Deep Tendon Reflexes: Reflexes normal.   Psychiatric:         Mood and Affect: Mood normal.         Behavior: Behavior normal.         Results for orders placed or performed in visit on 05/24/23   Urine Culture - Urine, Urine, Clean Catch    Specimen: Urine, Clean Catch   Result Value Ref Range    Urine Culture >100,000 CFU/mL Mixed Alexa Isolated    POCT urinalysis dipstick, automated    Specimen: Urine   Result Value Ref Range    Color Yellow Yellow, Straw, Dark Yellow, Jesika    Clarity, UA Clear Clear    Specific Gravity  1.030 1.005 - 1.030    pH,  Urine 6.0 5.0 - 8.0    Leukocytes Negative Negative    Nitrite, UA Negative Negative    Protein, POC Negative Negative mg/dL    Glucose, UA Negative Negative mg/dL    Ketones, UA Negative Negative    Urobilinogen, UA Normal Normal, 0.2 E.U./dL    Bilirubin Negative Negative    Blood, UA 1+ (A) Negative    Lot Number [id number]     Expiration Date 7/13/24        Diagnoses and all orders for this visit:    1. Routine general medical examination at a health care facility (Primary)  -     POCT urinalysis dipstick, automated  -     CBC (No Diff); Future  -     Comprehensive Metabolic Panel; Future  -     Lipid Panel; Future  -     TSH Rfx On Abnormal To Free T4; Future  -     Vitamin B12; Future  -     Hemoglobin A1c; Future    2. Hematuria, unspecified type  -     Urine Culture - Urine, Urine, Clean Catch; Future  -     Urine Culture - Urine, Urine, Clean Catch    3. Adjustment insomnia  -     zolpidem (Ambien) 5 MG tablet; Take 0.5-1 tablet by mouth At Night As Needed for Sleep.  Dispense: 30 tablet; Refill: 1  -     Desvenlafaxine Succinate ER (Pristiq) 25 MG tablet sustained-release 24 hour; Take 1 tablet by mouth Daily. Start taking 50 mg tablet once finished.  Dispense: 7 tablet; Refill: 0  -     desvenlafaxine (Pristiq) 50 MG 24 hr tablet; Take 1 tablet by mouth Daily. Start after finishing 25mg tablet.  Dispense: 30 tablet; Refill: 5    4. Situational stress    5. Vitamin D deficiency  -     Vitamin D,25-Hydroxy; Future    1. Health maintenance.  - Will order routine lab work.    2. Anxiety.  - Will prescribe Pristiq 25 mg daily for 1 week and then 50 mg daily thereafter.    3. Insomnia.  - Will prescribe Ambien 0.5 to 1 tablet at bedtime to take as needed for insomnia.  - She can try melatonin or Benadryl on a daily basis.      Health Maintenance   Topic Date Due   • ANNUAL PHYSICAL  05/25/2023   • INFLUENZA VACCINE  08/01/2023   • MAMMOGRAM BRCA POSITIVE  05/26/2024   • PAP SMEAR  05/24/2026   • TDAP/TD  VACCINES (3 - Td or Tdap) 01/29/2030   • HEPATITIS C SCREENING  Completed   • COVID-19 Vaccine  Completed   • Pneumococcal Vaccine 0-64  Aged Out       Discussion/Summary  Impression: health maintenance visit. Currently, she eats an adequate diet and has an adequate exercise regimen.   Cervical cancer screening:Pap smear is current.   Breast cancer screening: mammogram is current.   Colorectal cancer screening: colonoscopy is not indicated.  Osteoporosis screening: Bone mineral density test is not indicated .   CT low dose screen - not indicated  Screening lab work includes hemoglobin, glucose, lipid profile, thyroid function testing, 25-hydroxyvitamin D and urinalysis.   Immunizations are needed, immunizations will be given as outlined in the orders   Advice and education were given regarding cardiovascular risk reduction, healthy dietary habits, Seatbelt and helmet use and self skin examination.     Return in about 1 year (around 5/24/2024) for Annual.      Electronically signed by:    Viridiana Pearson MD     Transcribed from ambient dictation for Viridiana Pearson MD by Yajaira Monique, Quality .  05/24/23   15:57 EDT    Patient or patient representative verbalized consent to the visit recording.  I have personally performed the services described in this document as transcribed by the above individual, and it is both accurate and complete.  Viridiana Pearson MD  5/26/2023  23:57 EDT

## 2023-05-26 ENCOUNTER — HOSPITAL ENCOUNTER (OUTPATIENT)
Dept: ULTRASOUND IMAGING | Facility: HOSPITAL | Age: 38
Discharge: HOME | End: 2023-05-26
Payer: COMMERCIAL

## 2023-05-26 ENCOUNTER — HOSPITAL ENCOUNTER (OUTPATIENT)
Dept: MAMMOGRAPHY | Facility: HOSPITAL | Age: 38
Discharge: HOME | End: 2023-05-26
Payer: COMMERCIAL

## 2023-05-26 DIAGNOSIS — N63.10 MASS OF RIGHT BREAST, UNSPECIFIED QUADRANT: ICD-10-CM

## 2023-05-26 LAB — BACTERIA SPEC AEROBE CULT: NORMAL

## 2023-05-26 PROCEDURE — 77066 DX MAMMO INCL CAD BI: CPT | Performed by: RADIOLOGY

## 2023-05-26 PROCEDURE — 76642 ULTRASOUND BREAST LIMITED: CPT

## 2023-05-26 PROCEDURE — 77066 DX MAMMO INCL CAD BI: CPT

## 2023-05-26 PROCEDURE — 77062 BREAST TOMOSYNTHESIS BI: CPT | Performed by: RADIOLOGY

## 2023-05-26 PROCEDURE — G0279 TOMOSYNTHESIS, MAMMO: HCPCS

## 2023-06-05 ENCOUNTER — TRANSCRIBE ORDERS (OUTPATIENT)
Dept: PHYSICAL THERAPY | Facility: HOSPITAL | Age: 38
End: 2023-06-05
Payer: COMMERCIAL

## 2023-06-05 DIAGNOSIS — C50.919 MALIGNANT NEOPLASM OF FEMALE BREAST, UNSPECIFIED ESTROGEN RECEPTOR STATUS, UNSPECIFIED LATERALITY, UNSPECIFIED SITE OF BREAST: Primary | ICD-10-CM

## 2023-06-13 ENCOUNTER — HOSPITAL ENCOUNTER (OUTPATIENT)
Dept: ULTRASOUND IMAGING | Facility: HOSPITAL | Age: 38
Discharge: HOME | End: 2023-06-13
Admitting: NURSE PRACTITIONER
Payer: COMMERCIAL

## 2023-06-13 DIAGNOSIS — Z91.89 HIGH RISK OF OVARIAN CANCER: ICD-10-CM

## 2023-06-13 DIAGNOSIS — Z15.01 BRCA2 POSITIVE: ICD-10-CM

## 2023-06-13 DIAGNOSIS — Z15.09 BRCA2 POSITIVE: ICD-10-CM

## 2023-06-13 PROCEDURE — 76830 TRANSVAGINAL US NON-OB: CPT

## 2023-06-14 ENCOUNTER — TELEPHONE (OUTPATIENT)
Dept: GYNECOLOGIC ONCOLOGY | Facility: CLINIC | Age: 38
End: 2023-06-14
Payer: COMMERCIAL

## 2023-06-14 NOTE — TELEPHONE ENCOUNTER
----- Message from ANNIE Mcnulty sent at 6/14/2023  2:23 PM EDT -----  Please notify patient TVUS is normal. Simple appearing cysts/follicles at bilateral ovaries is normal, consistent with premenopausal state. Continue high risk surveillance as planned. Thanks!

## 2023-07-28 ENCOUNTER — PATIENT ROUNDING (BHMG ONLY) (OUTPATIENT)
Dept: ORTHOPEDIC SURGERY | Facility: CLINIC | Age: 38
End: 2023-07-28
Payer: COMMERCIAL

## 2023-07-28 ENCOUNTER — OFFICE VISIT (OUTPATIENT)
Dept: ORTHOPEDIC SURGERY | Facility: CLINIC | Age: 38
End: 2023-07-28
Payer: COMMERCIAL

## 2023-07-28 VITALS
BODY MASS INDEX: 28.06 KG/M2 | DIASTOLIC BLOOD PRESSURE: 77 MMHG | SYSTOLIC BLOOD PRESSURE: 117 MMHG | WEIGHT: 174.6 LBS | HEIGHT: 66 IN

## 2023-07-28 DIAGNOSIS — M65.312 TRIGGER THUMB OF LEFT HAND: ICD-10-CM

## 2023-07-28 DIAGNOSIS — M79.645 THUMB PAIN, LEFT: Primary | ICD-10-CM

## 2023-07-28 RX ADMIN — TRIAMCINOLONE ACETONIDE 0.5 ML: 40 INJECTION, SUSPENSION INTRA-ARTICULAR; INTRAMUSCULAR at 09:22

## 2023-07-28 RX ADMIN — LIDOCAINE HYDROCHLORIDE 0.5 ML: 10 INJECTION, SOLUTION EPIDURAL; INFILTRATION; INTRACAUDAL; PERINEURAL at 09:22

## 2023-07-28 NOTE — PROGRESS NOTES
July 28, 2023    Hello, may I speak with Temitope Plata?    My name is Alejandrina       I am  with [facility]  [address]  [place] [address].    Before we get started may I verify your date of birth? 1985    I am calling to officially welcome you to our practice and ask about your recent visit. Is this a good time to talk? yes    Tell me about your visit with us. What things went well?  Everything went really well.  No complaints.  No issues       We're always looking for ways to make our patients' experiences even better. Do you have recommendations on ways we may improve?  no    Overall were you satisfied with your first visit to our practice? yes       I appreciate you taking the time to speak with me today. Is there anything else I can do for you? no      Thank you, and have a great day.

## 2023-07-31 RX ORDER — LIDOCAINE HYDROCHLORIDE 10 MG/ML
0.5 INJECTION, SOLUTION EPIDURAL; INFILTRATION; INTRACAUDAL; PERINEURAL
Status: COMPLETED | OUTPATIENT
Start: 2023-07-28 | End: 2023-07-28

## 2023-07-31 RX ORDER — TRIAMCINOLONE ACETONIDE 40 MG/ML
0.5 INJECTION, SUSPENSION INTRA-ARTICULAR; INTRAMUSCULAR
Status: COMPLETED | OUTPATIENT
Start: 2023-07-28 | End: 2023-07-28

## 2023-10-24 ENCOUNTER — PRE-ADMISSION TESTING (OUTPATIENT)
Dept: PREADMISSION TESTING | Facility: HOSPITAL | Age: 38
End: 2023-10-24
Payer: COMMERCIAL

## 2023-10-24 VITALS — BODY MASS INDEX: 29.37 KG/M2 | WEIGHT: 176.26 LBS | HEIGHT: 65 IN

## 2023-10-24 LAB
ALBUMIN SERPL-MCNC: 4 G/DL (ref 3.5–5.2)
ALBUMIN/GLOB SERPL: 1.5 G/DL
ALP SERPL-CCNC: 73 U/L (ref 39–117)
ALT SERPL W P-5'-P-CCNC: 17 U/L (ref 1–33)
ANION GAP SERPL CALCULATED.3IONS-SCNC: 8 MMOL/L (ref 5–15)
AST SERPL-CCNC: 20 U/L (ref 1–32)
BILIRUB SERPL-MCNC: 0.6 MG/DL (ref 0–1.2)
BUN SERPL-MCNC: 8 MG/DL (ref 6–20)
BUN/CREAT SERPL: 12.1 (ref 7–25)
CALCIUM SPEC-SCNC: 9.2 MG/DL (ref 8.6–10.5)
CHLORIDE SERPL-SCNC: 105 MMOL/L (ref 98–107)
CO2 SERPL-SCNC: 28 MMOL/L (ref 22–29)
CREAT SERPL-MCNC: 0.66 MG/DL (ref 0.57–1)
DEPRECATED RDW RBC AUTO: 40.1 FL (ref 37–54)
EGFRCR SERPLBLD CKD-EPI 2021: 115.3 ML/MIN/1.73
ERYTHROCYTE [DISTWIDTH] IN BLOOD BY AUTOMATED COUNT: 11.7 % (ref 12.3–15.4)
GLOBULIN UR ELPH-MCNC: 2.7 GM/DL
GLUCOSE SERPL-MCNC: 104 MG/DL (ref 65–99)
HBA1C MFR BLD: 5.3 % (ref 4.8–5.6)
HCT VFR BLD AUTO: 44.7 % (ref 34–46.6)
HGB BLD-MCNC: 14.9 G/DL (ref 12–15.9)
MCH RBC QN AUTO: 31.1 PG (ref 26.6–33)
MCHC RBC AUTO-ENTMCNC: 33.3 G/DL (ref 31.5–35.7)
MCV RBC AUTO: 93.3 FL (ref 79–97)
PLATELET # BLD AUTO: 213 10*3/MM3 (ref 140–450)
PMV BLD AUTO: 10.9 FL (ref 6–12)
POTASSIUM SERPL-SCNC: 3.9 MMOL/L (ref 3.5–5.2)
PROT SERPL-MCNC: 6.7 G/DL (ref 6–8.5)
RBC # BLD AUTO: 4.79 10*6/MM3 (ref 3.77–5.28)
SODIUM SERPL-SCNC: 141 MMOL/L (ref 136–145)
WBC NRBC COR # BLD: 6.35 10*3/MM3 (ref 3.4–10.8)

## 2023-10-24 PROCEDURE — 83036 HEMOGLOBIN GLYCOSYLATED A1C: CPT

## 2023-10-24 PROCEDURE — 80053 COMPREHEN METABOLIC PANEL: CPT

## 2023-10-24 PROCEDURE — 85027 COMPLETE CBC AUTOMATED: CPT

## 2023-10-24 PROCEDURE — 36415 COLL VENOUS BLD VENIPUNCTURE: CPT

## 2023-10-30 ENCOUNTER — HOSPITAL ENCOUNTER (OUTPATIENT)
Facility: HOSPITAL | Age: 38
Discharge: HOME | End: 2023-10-31
Attending: SURGERY | Admitting: PLASTIC SURGERY
Payer: COMMERCIAL

## 2023-10-30 ENCOUNTER — ANESTHESIA EVENT CONVERTED (OUTPATIENT)
Dept: ANESTHESIOLOGY | Facility: HOSPITAL | Age: 38
End: 2023-10-30
Payer: COMMERCIAL

## 2023-10-30 ENCOUNTER — ANESTHESIA (OUTPATIENT)
Dept: PERIOP | Facility: HOSPITAL | Age: 38
End: 2023-10-30
Payer: COMMERCIAL

## 2023-10-30 ENCOUNTER — ANESTHESIA EVENT (OUTPATIENT)
Dept: PERIOP | Facility: HOSPITAL | Age: 38
End: 2023-10-30
Payer: COMMERCIAL

## 2023-10-30 DIAGNOSIS — Z15.01 BRCA POSITIVE: ICD-10-CM

## 2023-10-30 DIAGNOSIS — Z15.09 BRCA POSITIVE: ICD-10-CM

## 2023-10-30 PROBLEM — Z90.10 ACQUIRED ABSENCE OF BREAST AND ABSENT NIPPLE: Status: ACTIVE | Noted: 2023-10-30

## 2023-10-30 LAB
B-HCG UR QL: NEGATIVE
EXPIRATION DATE: NORMAL
INTERNAL NEGATIVE CONTROL: NEGATIVE
INTERNAL POSITIVE CONTROL: POSITIVE
Lab: NORMAL
POTASSIUM SERPL-SCNC: 3.9 MMOL/L (ref 3.5–5.2)

## 2023-10-30 PROCEDURE — G0378 HOSPITAL OBSERVATION PER HR: HCPCS

## 2023-10-30 PROCEDURE — 84132 ASSAY OF SERUM POTASSIUM: CPT | Performed by: ANESTHESIOLOGY

## 2023-10-30 PROCEDURE — 25010000002 BUPIVACAINE (PF) 0.25 % SOLUTION

## 2023-10-30 PROCEDURE — 25010000002 MIDAZOLAM PER 1 MG: Performed by: ANESTHESIOLOGY

## 2023-10-30 PROCEDURE — 25010000002 GENTAMICIN PER 80 MG: Performed by: PLASTIC SURGERY

## 2023-10-30 PROCEDURE — 25010000002 SUGAMMADEX 200 MG/2ML SOLUTION

## 2023-10-30 PROCEDURE — 25010000002 DEXAMETHASONE SODIUM PHOSPHATE 10 MG/ML SOLUTION

## 2023-10-30 PROCEDURE — 25010000002 ONDANSETRON PER 1 MG

## 2023-10-30 PROCEDURE — 25010000002 CEFAZOLIN PER 500 MG: Performed by: SURGERY

## 2023-10-30 PROCEDURE — C1789 PROSTHESIS, BREAST, IMP: HCPCS | Performed by: SURGERY

## 2023-10-30 PROCEDURE — 25010000002 PROPOFOL 10 MG/ML EMULSION

## 2023-10-30 PROCEDURE — 25010000002 CEFAZOLIN PER 500 MG: Performed by: PLASTIC SURGERY

## 2023-10-30 PROCEDURE — 81025 URINE PREGNANCY TEST: CPT | Performed by: PLASTIC SURGERY

## 2023-10-30 PROCEDURE — 25810000003 SODIUM CHLORIDE 0.9 % SOLUTION: Performed by: PLASTIC SURGERY

## 2023-10-30 PROCEDURE — 25010000002 FENTANYL CITRATE (PF) 50 MCG/ML SOLUTION

## 2023-10-30 PROCEDURE — 88307 TISSUE EXAM BY PATHOLOGIST: CPT | Performed by: SURGERY

## 2023-10-30 PROCEDURE — 25810000003 SODIUM CHLORIDE PER 500 ML: Performed by: PLASTIC SURGERY

## 2023-10-30 PROCEDURE — 25810000003 LACTATED RINGERS PER 1000 ML: Performed by: ANESTHESIOLOGY

## 2023-10-30 PROCEDURE — 25010000002 FENTANYL CITRATE (PF) 100 MCG/2ML SOLUTION

## 2023-10-30 DEVICE — LIGACLIP MCA MULTIPLE CLIP APPLIERS, 20 MEDIUM CLIPS
Type: IMPLANTABLE DEVICE | Site: BREAST | Status: FUNCTIONAL
Brand: LIGACLIP

## 2023-10-30 DEVICE — NATRELLE TE SMOOTH 133S-FX-12-T (US)
Type: IMPLANTABLE DEVICE | Site: BREAST | Status: FUNCTIONAL
Brand: NATRELLE 133S TISSUE EXPANDERS

## 2023-10-30 DEVICE — GRFT TISS ALLODERM RTM PERF 16X20CM 2.4MM/THK .4MM: Type: IMPLANTABLE DEVICE | Site: BREAST | Status: FUNCTIONAL

## 2023-10-30 DEVICE — DEV CONTRL TISS STRATAFIX SPIRAL MNCRYL UD 3/0 PLS 30CM: Type: IMPLANTABLE DEVICE | Site: BREAST | Status: FUNCTIONAL

## 2023-10-30 RX ORDER — FENTANYL CITRATE 50 UG/ML
INJECTION, SOLUTION INTRAMUSCULAR; INTRAVENOUS
Status: COMPLETED
Start: 2023-10-30 | End: 2023-10-30

## 2023-10-30 RX ORDER — ONDANSETRON 2 MG/ML
INJECTION INTRAMUSCULAR; INTRAVENOUS AS NEEDED
Status: DISCONTINUED | OUTPATIENT
Start: 2023-10-30 | End: 2023-10-30 | Stop reason: SURG

## 2023-10-30 RX ORDER — DEXMEDETOMIDINE HYDROCHLORIDE 4 UG/ML
INJECTION, SOLUTION INTRAVENOUS AS NEEDED
Status: DISCONTINUED | OUTPATIENT
Start: 2023-10-30 | End: 2023-10-30 | Stop reason: SURG

## 2023-10-30 RX ORDER — DROPERIDOL 2.5 MG/ML
0.62 INJECTION, SOLUTION INTRAMUSCULAR; INTRAVENOUS
Status: DISCONTINUED | OUTPATIENT
Start: 2023-10-30 | End: 2023-10-30 | Stop reason: HOSPADM

## 2023-10-30 RX ORDER — BUPIVACAINE HYDROCHLORIDE 2.5 MG/ML
INJECTION, SOLUTION EPIDURAL; INFILTRATION; INTRACAUDAL
Status: COMPLETED | OUTPATIENT
Start: 2023-10-30 | End: 2023-10-30

## 2023-10-30 RX ORDER — PROMETHAZINE HYDROCHLORIDE 25 MG/1
25 SUPPOSITORY RECTAL ONCE AS NEEDED
Status: DISCONTINUED | OUTPATIENT
Start: 2023-10-30 | End: 2023-10-30 | Stop reason: HOSPADM

## 2023-10-30 RX ORDER — LIDOCAINE HYDROCHLORIDE 10 MG/ML
INJECTION, SOLUTION EPIDURAL; INFILTRATION; INTRACAUDAL; PERINEURAL AS NEEDED
Status: DISCONTINUED | OUTPATIENT
Start: 2023-10-30 | End: 2023-10-30 | Stop reason: SURG

## 2023-10-30 RX ORDER — PROMETHAZINE HYDROCHLORIDE 25 MG/1
25 TABLET ORAL ONCE AS NEEDED
Status: DISCONTINUED | OUTPATIENT
Start: 2023-10-30 | End: 2023-10-30 | Stop reason: HOSPADM

## 2023-10-30 RX ORDER — HYDRALAZINE HYDROCHLORIDE 20 MG/ML
5 INJECTION INTRAMUSCULAR; INTRAVENOUS
Status: DISCONTINUED | OUTPATIENT
Start: 2023-10-30 | End: 2023-10-30 | Stop reason: HOSPADM

## 2023-10-30 RX ORDER — NALOXONE HCL 0.4 MG/ML
0.4 VIAL (ML) INJECTION AS NEEDED
Status: DISCONTINUED | OUTPATIENT
Start: 2023-10-30 | End: 2023-10-30 | Stop reason: HOSPADM

## 2023-10-30 RX ORDER — EPHEDRINE SULFATE 50 MG/ML
INJECTION INTRAVENOUS AS NEEDED
Status: DISCONTINUED | OUTPATIENT
Start: 2023-10-30 | End: 2023-10-30 | Stop reason: SURG

## 2023-10-30 RX ORDER — SODIUM CHLORIDE 9 MG/ML
50 INJECTION, SOLUTION INTRAVENOUS CONTINUOUS
Status: DISCONTINUED | OUTPATIENT
Start: 2023-10-30 | End: 2023-10-31 | Stop reason: HOSPADM

## 2023-10-30 RX ORDER — FENTANYL CITRATE 50 UG/ML
50 INJECTION, SOLUTION INTRAMUSCULAR; INTRAVENOUS
Status: DISCONTINUED | OUTPATIENT
Start: 2023-10-30 | End: 2023-10-30 | Stop reason: HOSPADM

## 2023-10-30 RX ORDER — PROMETHAZINE HYDROCHLORIDE 12.5 MG/1
6.25 SUPPOSITORY RECTAL EVERY 6 HOURS PRN
Status: DISCONTINUED | OUTPATIENT
Start: 2023-10-30 | End: 2023-10-31 | Stop reason: HOSPADM

## 2023-10-30 RX ORDER — SODIUM CHLORIDE 9 MG/ML
INJECTION, SOLUTION INTRAVENOUS AS NEEDED
Status: DISCONTINUED | OUTPATIENT
Start: 2023-10-30 | End: 2023-10-30 | Stop reason: HOSPADM

## 2023-10-30 RX ORDER — MELOXICAM 7.5 MG/1
15 TABLET ORAL DAILY
Status: DISCONTINUED | OUTPATIENT
Start: 2023-10-31 | End: 2023-10-31 | Stop reason: HOSPADM

## 2023-10-30 RX ORDER — SODIUM CHLORIDE 0.9 % (FLUSH) 0.9 %
3-10 SYRINGE (ML) INJECTION AS NEEDED
Status: DISCONTINUED | OUTPATIENT
Start: 2023-10-30 | End: 2023-10-30 | Stop reason: HOSPADM

## 2023-10-30 RX ORDER — FENTANYL CITRATE 50 UG/ML
INJECTION, SOLUTION INTRAMUSCULAR; INTRAVENOUS AS NEEDED
Status: DISCONTINUED | OUTPATIENT
Start: 2023-10-30 | End: 2023-10-30 | Stop reason: SURG

## 2023-10-30 RX ORDER — DEXAMETHASONE SODIUM PHOSPHATE 10 MG/ML
INJECTION, SOLUTION INTRAMUSCULAR; INTRAVENOUS
Status: COMPLETED | OUTPATIENT
Start: 2023-10-30 | End: 2023-10-30

## 2023-10-30 RX ORDER — ACETAMINOPHEN 500 MG
1000 TABLET ORAL EVERY 6 HOURS SCHEDULED
Status: DISCONTINUED | OUTPATIENT
Start: 2023-10-30 | End: 2023-10-31 | Stop reason: HOSPADM

## 2023-10-30 RX ORDER — ONDANSETRON 2 MG/ML
4 INJECTION INTRAMUSCULAR; INTRAVENOUS EVERY 6 HOURS PRN
Status: DISCONTINUED | OUTPATIENT
Start: 2023-10-30 | End: 2023-10-31 | Stop reason: HOSPADM

## 2023-10-30 RX ORDER — FAMOTIDINE 20 MG/1
20 TABLET, FILM COATED ORAL
Status: COMPLETED | OUTPATIENT
Start: 2023-10-30 | End: 2023-10-30

## 2023-10-30 RX ORDER — SODIUM CHLORIDE 0.9 % (FLUSH) 0.9 %
10 SYRINGE (ML) INJECTION AS NEEDED
Status: DISCONTINUED | OUTPATIENT
Start: 2023-10-30 | End: 2023-10-30 | Stop reason: HOSPADM

## 2023-10-30 RX ORDER — CYCLOBENZAPRINE HCL 10 MG
10 TABLET ORAL EVERY 8 HOURS PRN
Status: DISCONTINUED | OUTPATIENT
Start: 2023-10-30 | End: 2023-10-31 | Stop reason: HOSPADM

## 2023-10-30 RX ORDER — SCOLOPAMINE TRANSDERMAL SYSTEM 1 MG/1
1 PATCH, EXTENDED RELEASE TRANSDERMAL CONTINUOUS
Status: DISCONTINUED | OUTPATIENT
Start: 2023-10-30 | End: 2023-10-31 | Stop reason: HOSPADM

## 2023-10-30 RX ORDER — ROCURONIUM BROMIDE 10 MG/ML
INJECTION, SOLUTION INTRAVENOUS AS NEEDED
Status: DISCONTINUED | OUTPATIENT
Start: 2023-10-30 | End: 2023-10-30 | Stop reason: SURG

## 2023-10-30 RX ORDER — MIDAZOLAM HYDROCHLORIDE 1 MG/ML
1 INJECTION INTRAMUSCULAR; INTRAVENOUS
Status: COMPLETED | OUTPATIENT
Start: 2023-10-30 | End: 2023-10-30

## 2023-10-30 RX ORDER — IPRATROPIUM BROMIDE AND ALBUTEROL SULFATE 2.5; .5 MG/3ML; MG/3ML
3 SOLUTION RESPIRATORY (INHALATION) ONCE AS NEEDED
Status: DISCONTINUED | OUTPATIENT
Start: 2023-10-30 | End: 2023-10-30 | Stop reason: HOSPADM

## 2023-10-30 RX ORDER — ONDANSETRON 2 MG/ML
4 INJECTION INTRAMUSCULAR; INTRAVENOUS ONCE AS NEEDED
Status: DISCONTINUED | OUTPATIENT
Start: 2023-10-30 | End: 2023-10-30 | Stop reason: HOSPADM

## 2023-10-30 RX ORDER — SODIUM CHLORIDE, SODIUM LACTATE, POTASSIUM CHLORIDE, CALCIUM CHLORIDE 600; 310; 30; 20 MG/100ML; MG/100ML; MG/100ML; MG/100ML
9 INJECTION, SOLUTION INTRAVENOUS CONTINUOUS PRN
Status: DISCONTINUED | OUTPATIENT
Start: 2023-10-30 | End: 2023-10-30 | Stop reason: HOSPADM

## 2023-10-30 RX ORDER — MEPERIDINE HYDROCHLORIDE 25 MG/ML
12.5 INJECTION INTRAMUSCULAR; INTRAVENOUS; SUBCUTANEOUS
Status: DISCONTINUED | OUTPATIENT
Start: 2023-10-30 | End: 2023-10-30 | Stop reason: HOSPADM

## 2023-10-30 RX ORDER — DROPERIDOL 2.5 MG/ML
0.62 INJECTION, SOLUTION INTRAMUSCULAR; INTRAVENOUS ONCE AS NEEDED
Status: DISCONTINUED | OUTPATIENT
Start: 2023-10-30 | End: 2023-10-30 | Stop reason: HOSPADM

## 2023-10-30 RX ORDER — SODIUM CHLORIDE 9 MG/ML
40 INJECTION, SOLUTION INTRAVENOUS AS NEEDED
Status: DISCONTINUED | OUTPATIENT
Start: 2023-10-30 | End: 2023-10-30 | Stop reason: HOSPADM

## 2023-10-30 RX ORDER — LABETALOL HYDROCHLORIDE 5 MG/ML
5 INJECTION, SOLUTION INTRAVENOUS
Status: DISCONTINUED | OUTPATIENT
Start: 2023-10-30 | End: 2023-10-30 | Stop reason: HOSPADM

## 2023-10-30 RX ORDER — LIDOCAINE HYDROCHLORIDE 10 MG/ML
0.5 INJECTION, SOLUTION EPIDURAL; INFILTRATION; INTRACAUDAL; PERINEURAL ONCE AS NEEDED
Status: COMPLETED | OUTPATIENT
Start: 2023-10-30 | End: 2023-10-30

## 2023-10-30 RX ORDER — NALOXONE HCL 0.4 MG/ML
0.4 VIAL (ML) INJECTION
Status: DISCONTINUED | OUTPATIENT
Start: 2023-10-30 | End: 2023-10-31 | Stop reason: HOSPADM

## 2023-10-30 RX ORDER — PHENYLEPHRINE HCL IN 0.9% NACL 1 MG/10 ML
SYRINGE (ML) INTRAVENOUS AS NEEDED
Status: DISCONTINUED | OUTPATIENT
Start: 2023-10-30 | End: 2023-10-30 | Stop reason: SURG

## 2023-10-30 RX ORDER — TRAMADOL HYDROCHLORIDE 50 MG/1
50 TABLET ORAL EVERY 6 HOURS PRN
Status: DISCONTINUED | OUTPATIENT
Start: 2023-10-30 | End: 2023-10-31 | Stop reason: HOSPADM

## 2023-10-30 RX ORDER — HYDROMORPHONE HYDROCHLORIDE 1 MG/ML
0.5 INJECTION, SOLUTION INTRAMUSCULAR; INTRAVENOUS; SUBCUTANEOUS
Status: DISCONTINUED | OUTPATIENT
Start: 2023-10-30 | End: 2023-10-30 | Stop reason: HOSPADM

## 2023-10-30 RX ORDER — SODIUM CHLORIDE 0.9 % (FLUSH) 0.9 %
3 SYRINGE (ML) INJECTION EVERY 12 HOURS SCHEDULED
Status: DISCONTINUED | OUTPATIENT
Start: 2023-10-30 | End: 2023-10-30 | Stop reason: HOSPADM

## 2023-10-30 RX ORDER — DIPHENHYDRAMINE HYDROCHLORIDE 50 MG/ML
12.5 INJECTION INTRAMUSCULAR; INTRAVENOUS
Status: DISCONTINUED | OUTPATIENT
Start: 2023-10-30 | End: 2023-10-30 | Stop reason: HOSPADM

## 2023-10-30 RX ORDER — OXYCODONE HYDROCHLORIDE 5 MG/1
5 TABLET ORAL EVERY 4 HOURS PRN
Status: DISCONTINUED | OUTPATIENT
Start: 2023-10-30 | End: 2023-10-30

## 2023-10-30 RX ORDER — MAGNESIUM HYDROXIDE 1200 MG/15ML
LIQUID ORAL AS NEEDED
Status: DISCONTINUED | OUTPATIENT
Start: 2023-10-30 | End: 2023-10-30 | Stop reason: HOSPADM

## 2023-10-30 RX ORDER — PREGABALIN 75 MG/1
75 CAPSULE ORAL ONCE
Status: COMPLETED | OUTPATIENT
Start: 2023-10-30 | End: 2023-10-30

## 2023-10-30 RX ORDER — HYDROCODONE BITARTRATE AND ACETAMINOPHEN 5; 325 MG/1; MG/1
1 TABLET ORAL ONCE AS NEEDED
Status: DISCONTINUED | OUTPATIENT
Start: 2023-10-30 | End: 2023-10-30 | Stop reason: HOSPADM

## 2023-10-30 RX ORDER — PROMETHAZINE HYDROCHLORIDE 12.5 MG/1
6.25 TABLET ORAL EVERY 6 HOURS PRN
Status: DISCONTINUED | OUTPATIENT
Start: 2023-10-30 | End: 2023-10-31 | Stop reason: HOSPADM

## 2023-10-30 RX ORDER — PROPOFOL 10 MG/ML
VIAL (ML) INTRAVENOUS AS NEEDED
Status: DISCONTINUED | OUTPATIENT
Start: 2023-10-30 | End: 2023-10-30 | Stop reason: SURG

## 2023-10-30 RX ORDER — DIPHENHYDRAMINE HYDROCHLORIDE 50 MG/ML
12.5 INJECTION INTRAMUSCULAR; INTRAVENOUS EVERY 6 HOURS PRN
Status: DISCONTINUED | OUTPATIENT
Start: 2023-10-30 | End: 2023-10-31 | Stop reason: HOSPADM

## 2023-10-30 RX ORDER — SODIUM CHLORIDE 0.9 % (FLUSH) 0.9 %
10 SYRINGE (ML) INJECTION EVERY 12 HOURS SCHEDULED
Status: DISCONTINUED | OUTPATIENT
Start: 2023-10-30 | End: 2023-10-30 | Stop reason: HOSPADM

## 2023-10-30 RX ADMIN — Medication 100 MCG: at 14:52

## 2023-10-30 RX ADMIN — CYCLOBENZAPRINE 10 MG: 10 TABLET, FILM COATED ORAL at 17:33

## 2023-10-30 RX ADMIN — DEXMEDETOMIDINE HYDROCHLORIDE 8 MCG: 4 INJECTION, SOLUTION INTRAVENOUS at 15:32

## 2023-10-30 RX ADMIN — MIDAZOLAM HYDROCHLORIDE 1 MG: 1 INJECTION, SOLUTION INTRAMUSCULAR; INTRAVENOUS at 11:40

## 2023-10-30 RX ADMIN — SCOPOLAMINE 1 PATCH: 1.5 PATCH, EXTENDED RELEASE TRANSDERMAL at 09:15

## 2023-10-30 RX ADMIN — Medication 100 MCG: at 14:05

## 2023-10-30 RX ADMIN — DEXAMETHASONE SODIUM PHOSPHATE 4 MG: 10 INJECTION, SOLUTION INTRAMUSCULAR; INTRAVENOUS at 11:55

## 2023-10-30 RX ADMIN — FENTANYL CITRATE 50 MCG: 50 INJECTION, SOLUTION INTRAMUSCULAR; INTRAVENOUS at 16:18

## 2023-10-30 RX ADMIN — SUGAMMADEX 200 MG: 100 INJECTION, SOLUTION INTRAVENOUS at 15:32

## 2023-10-30 RX ADMIN — ONDANSETRON 4 MG: 2 INJECTION INTRAMUSCULAR; INTRAVENOUS at 15:15

## 2023-10-30 RX ADMIN — Medication 100 MCG: at 13:04

## 2023-10-30 RX ADMIN — ROCURONIUM BROMIDE 60 MG: 10 SOLUTION INTRAVENOUS at 11:54

## 2023-10-30 RX ADMIN — FENTANYL CITRATE 50 MCG: 50 INJECTION, SOLUTION INTRAMUSCULAR; INTRAVENOUS at 16:42

## 2023-10-30 RX ADMIN — MIDAZOLAM HYDROCHLORIDE 1 MG: 1 INJECTION, SOLUTION INTRAMUSCULAR; INTRAVENOUS at 10:09

## 2023-10-30 RX ADMIN — PROPOFOL 25 MCG/KG/MIN: 10 INJECTION, EMULSION INTRAVENOUS at 12:01

## 2023-10-30 RX ADMIN — BUPIVACAINE HYDROCHLORIDE 60 ML: 2.5 INJECTION, SOLUTION EPIDURAL; INFILTRATION; INTRACAUDAL; PERINEURAL at 11:55

## 2023-10-30 RX ADMIN — FAMOTIDINE 20 MG: 20 TABLET, FILM COATED ORAL at 09:15

## 2023-10-30 RX ADMIN — PREGABALIN 75 MG: 75 CAPSULE ORAL at 09:15

## 2023-10-30 RX ADMIN — LIDOCAINE HYDROCHLORIDE 100 MG: 10 INJECTION, SOLUTION EPIDURAL; INFILTRATION; INTRACAUDAL; PERINEURAL at 11:54

## 2023-10-30 RX ADMIN — EPHEDRINE SULFATE 5 MG: 50 INJECTION INTRAVENOUS at 12:58

## 2023-10-30 RX ADMIN — ROCURONIUM BROMIDE 20 MG: 10 SOLUTION INTRAVENOUS at 13:16

## 2023-10-30 RX ADMIN — ACETAMINOPHEN 1000 MG: 500 TABLET ORAL at 23:04

## 2023-10-30 RX ADMIN — FENTANYL CITRATE 50 MCG: 50 INJECTION, SOLUTION INTRAMUSCULAR; INTRAVENOUS at 16:06

## 2023-10-30 RX ADMIN — SODIUM CHLORIDE 50 ML/HR: 9 INJECTION, SOLUTION INTRAVENOUS at 17:34

## 2023-10-30 RX ADMIN — LIDOCAINE HYDROCHLORIDE 0.5 ML: 10 INJECTION, SOLUTION EPIDURAL; INFILTRATION; INTRACAUDAL; PERINEURAL at 09:17

## 2023-10-30 RX ADMIN — PROPOFOL 200 MG: 10 INJECTION, EMULSION INTRAVENOUS at 11:54

## 2023-10-30 RX ADMIN — SODIUM CHLORIDE 2000 MG: 900 INJECTION INTRAVENOUS at 11:57

## 2023-10-30 RX ADMIN — FENTANYL CITRATE 50 MCG: 50 INJECTION, SOLUTION INTRAMUSCULAR; INTRAVENOUS at 12:05

## 2023-10-30 RX ADMIN — ACETAMINOPHEN 1000 MG: 500 TABLET ORAL at 17:33

## 2023-10-30 RX ADMIN — EPHEDRINE SULFATE 5 MG: 50 INJECTION INTRAVENOUS at 12:59

## 2023-10-30 RX ADMIN — FENTANYL CITRATE 50 MCG: 50 INJECTION, SOLUTION INTRAMUSCULAR; INTRAVENOUS at 11:54

## 2023-10-30 RX ADMIN — SODIUM CHLORIDE, POTASSIUM CHLORIDE, SODIUM LACTATE AND CALCIUM CHLORIDE 9 ML/HR: 600; 310; 30; 20 INJECTION, SOLUTION INTRAVENOUS at 09:15

## 2023-10-30 RX ADMIN — TRAMADOL HYDROCHLORIDE 50 MG: 50 TABLET ORAL at 18:42

## 2023-10-30 NOTE — ANESTHESIA PROCEDURE NOTES
Airway  Urgency: elective    Date/Time: 10/30/2023 11:56 AM  Airway not difficult    General Information and Staff    Patient location during procedure: OR  CRNA/CAA: Martha Smith CRNA    Indications and Patient Condition  Indications for airway management: airway protection    Preoxygenated: yes  MILS not maintained throughout  Mask difficulty assessment: 1 - vent by mask    Final Airway Details  Final airway type: endotracheal airway      Successful airway: ETT  Cuffed: yes   Successful intubation technique: direct laryngoscopy  Facilitating devices/methods: intubating stylet  Endotracheal tube insertion site: oral  Blade: Valerie  Blade size: 2  ETT size (mm): 7.5  Cormack-Lehane Classification: grade I - full view of glottis  Placement verified by: chest auscultation and capnometry   Measured from: lips  ETT/EBT  to lips (cm): 22  Number of attempts at approach: 1  Assessment: lips, teeth, and gum same as pre-op and atraumatic intubation    Additional Comments  Negative epigastric sounds, Breath sound equal bilaterally with symmetric chest rise and fall

## 2023-10-30 NOTE — ANESTHESIA PREPROCEDURE EVALUATION
Anesthesia Evaluation     Patient summary reviewed and Nursing notes reviewed   no history of anesthetic complications:   NPO Solid Status: > 8 hours  NPO Liquid Status: > 2 hours           Airway   Mallampati: II  TM distance: >3 FB  Neck ROM: full  No difficulty expected  Dental - normal exam     Pulmonary - negative pulmonary ROS    breath sounds clear to auscultation  Cardiovascular - negative cardio ROS  Exercise tolerance: excellent (>7 METS)    Rhythm: regular  Rate: normal        Neuro/Psych- negative ROS  GI/Hepatic/Renal/Endo    (+) obesity    Musculoskeletal     Abdominal   (+) obese    Abdomen: soft.   Substance History      OB/GYN          Other   arthritis,     (-) history of cancer    Other Comment: RISK FACTORS FOR BC              Anesthesia Plan    ASA 2     general with block     intravenous induction     Anesthetic plan, risks, benefits, and alternatives have been provided, discussed and informed consent has been obtained with: patient.    Plan discussed with CRNA.    CODE STATUS:

## 2023-10-30 NOTE — ANESTHESIA POSTPROCEDURE EVALUATION
Patient: Temitope Plata    Procedure Summary       Date: 10/30/23 Room / Location:  [facility]    Anesthesia Start: 1149 Anesthesia Stop: 1557    Procedures:       BREAST NIPPLE SPARING MASTECTOMY- BILATERAL (Bilateral: Breast)      IMMEDIATE BREAST RECONSTRUCTION WITH BREAST TISSUE EXPANDER AND ALLODERM PLACEMENT - BILATERAL (Bilateral: Breast) Diagnosis:     Surgeons: Heather Irene MD; Ric Irene MD Provider: Basil Tucker MD    Anesthesia Type: general with block ASA Status: 2            Anesthesia Type: general with block    Vitals  Vitals Value Taken Time   /75 10/30/23 1558   Temp 97.2 °F (36.2 °C) 10/30/23 1558   Pulse 67 10/30/23 1558   Resp 15 10/30/23 1558   SpO2 99 % 10/30/23 1558           Post Anesthesia Care and Evaluation    Patient location during evaluation: PACU  Patient participation: complete - patient participated  Level of consciousness: awake and alert  Pain score: 1  Pain management: adequate    Airway patency: patent  Anesthetic complications: No anesthetic complications  PONV Status: none  Cardiovascular status: hemodynamically stable and acceptable  Respiratory status: nonlabored ventilation, acceptable and nasal cannula  Hydration status: acceptable

## 2023-10-30 NOTE — ANESTHESIA PROCEDURE NOTES
Peripheral Block    Pre-sedation assessment completed: 10/30/2023 11:54 AM    Patient reassessed immediately prior to procedure    Patient location during procedure: OR  Start time: 10/30/2023 11:55 AM  Stop time: 10/30/2023 12:05 PM  Reason for block: at surgeon's request and post-op pain management  Performed by  Anesthesiologist: Jose Mathis MD  Preanesthetic Checklist  Completed: patient identified, IV checked, site marked, risks and benefits discussed, surgical consent, monitors and equipment checked, pre-op evaluation and timeout performed  Prep:  Pt Position: supine  Sterile barriers:cap, gloves, mask and washed/disinfected hands  Prep: ChloraPrep  Patient monitoring: blood pressure monitoring, continuous pulse oximetry and EKG  Procedure  Performed under: general  Guidance:ultrasound guided and landmark technique  Images:still images obtained, printed/placed on chart    Laterality:Bilateral  Block Type:PECS I and PECS II  Injection Technique:single-shot  Needle Type:short-bevel  Needle Gauge:20 G  Resistance on Injection: none    Medications Used: bupivacaine PF (MARCAINE) 0.25 % injection - Injection   60 mL - 10/30/2023 11:55:00 AM  dexamethasone sodium phosphate injection - Injection   4 mg - 10/30/2023 11:55:00 AM      Medications  Preservative Free Saline:10ml  Comment:Block Injection:  Total volume of LA divided between Right and Left sided blocks         Post Assessment  Injection Assessment: negative aspiration for heme, incremental injection and no paresthesia on injection  Patient Tolerance:comfortable throughout block  Complications:no  Additional Notes  Interpectoral-Pectoserratus Plane   A high-frequency linear transducer, with sterile cover, was placed medial to the coracoid process in the paramedian sagittal plane. The transducer was moved caudally to the 4th rib and rotated slightly to allow an in-plane needle trajectory from medial to lateral. Pectoralis Major Muscle (PMM),  "Pectoralis Minor Muscle (PmM), Thoracoacromial Artery, Ribs, and Pleura were identified under ultrasound. The insertion site was prepped in sterile fashion and then localized with 2-5 ml of 1% Lidocaine. Using ultrasound-guidance, a 20-gauge B-Manzano 4\" Ultraplex 360 non-stimulating echogenic needle was advanced in plane until the tip of the needle was in the fascial plane between the PMM and PmM, lateral to the Thoracoacromial Artery. 1-3ml of preservative free normal saline was used to hydro-dissect the fascial planes. After the fascial plane was verified, 10ml local anesthetic (LA) was injected for Interpectoral fascial plane block. The needle was continued along the same path to the level of the 4th rib below PmM.  Initially preservative free normal saline was used to confirm needle position and then 20 ml of LA was injected for Pectoserratus fascial plane block. Aspiration every 5 ml to prevent intravascular injection. Injection was completed with negative aspiration of blood and negative intravascular injection. Injection pressures were normal with minimal resistance.               "

## 2023-10-30 NOTE — PLAN OF CARE
Goal Outcome Evaluation:                      Pt has been stable since arrival onto unit. Prn and scheduled meds given for pain, see mar. No other concerns noted.    The patient was asked if she would like a chaperone present for her intimate exam. She  Declined the chaperone.  Kenrick Potts CMA ([address] [place] [address])

## 2023-10-30 NOTE — H&P
Plastic Surgery Consult      Admission Date:  10/30/2023  LOS:  0  Patient Care Team:  Viridiana Pearson MD as PCP - General (Internal Medicine)    Patient Name:  Temitope Plata  :  1985  MRN:  [id number]    Date:  10/30/2023      History of Present Illness:  Temitope Plata is a 38yoF nonsmoker  BRCA 2 positive wanting breast cancer risk reducing surgery.    Patient states father with kidney cancer. Patient went to a  and underwent genetic testing. Patient came back BRCA 2 positive. Patient has two children. Patient was referred to Dr. Heather Irene prophylactic breast cancer treatment. Patient has elected for bilateral mastectomies. Patient is interested in immediate reconstruction. Patient is nonsmoker. Has no hx of breast surgery. Patient interested in smaller breast.  Patient was seen in clinic and found appropriate for staged approach to reconstruction. Patient is a candidate for breast reduction/lift prior to mastectomies for NSM with TE and alloderm followed by lateral implants. Patient was in agreement. On 23 patient underwent bilateral breast reduction. Patient recovered well no complications. Patient now ready for mastectomies with immediate breast reconstruction.    Goal: 36D to B cup  Breast Surgery: None  Breast pathology: None    PMH: None  PSH: C section, wisdom teeth, salpingectomy  SH: denies smoking, drinking, or illicit drug abuse. Lives in [facility]. Mother working in .  FH: Family History of Breast cancer: None  Allergies: NKDA  Medications: No blood thinners or herbal supplements.      History:   Past Medical History:   Diagnosis Date    BRCA2 gene mutation positive     Hemorrhoid     History of medical problems     BRCA-2 mutation    Ovarian cyst      Past Surgical History:   Procedure Laterality Date     SECTION N/A 2020    Procedure:  SECTION PRIMARY;  Surgeon: Cristel Figueroa MD;  Location: [facility] LABOR DELIVERY;  Service:  Obstetrics/Gynecology;  Laterality: N/A;    OVARIAN CYST REMOVAL  2015    SALPINGECTOMY Bilateral 2020    DURING C SECTION    WISDOM TOOTH EXTRACTION       Family History   Problem Relation Age of Onset    Lymphoma Mother 64        non-Hodgkins    Cancer Mother         Lymphoma    Kidney cancer Father 46        metastatic to brain and lung    Cancer Father         Kidney cancer    Uterine cancer Maternal Grandmother 45    Breast cancer Maternal Grandmother 45    Diabetes Maternal Grandmother     Ovarian cancer Maternal Grandmother 62    Cancer Maternal Grandmother         Uterine cancer, breast cancer    Other Paternal Grandmother         Hashimotos disease    Breast cancer Maternal Aunt     Hashimoto's thyroiditis Paternal Cousin     Thyroid cancer Paternal Cousin     Hashimoto's thyroiditis Paternal Cousin     Thyroid cancer Paternal Cousin     Hashimoto's thyroiditis Paternal Cousin     Breast cancer Maternal Cousin     Breast cancer Maternal Cousin      Social History     Socioeconomic History    Marital status:    Tobacco Use    Smoking status: Never    Smokeless tobacco: Never    Tobacco comments:     None   Vaping Use    Vaping Use: Never used   Substance and Sexual Activity    Alcohol use: Yes     Alcohol/week: 1.0 - 2.0 standard drink of alcohol     Types: 1 - 2 Glasses of wine per week     Comment: or less    Drug use: Never    Sexual activity: Yes     Partners: Male     Birth control/protection: Surgical     Comment: Salpingectomy, Feb 2020     Allergies   Allergen Reactions    Oxycodone Nausea Only and Dizziness     Zofran did not help       Medication:    Current Facility-Administered Medications:     ceFAZolin 2000 mg IVPB in 100 mL NS (MBP), 2,000 mg, Intravenous, Once, Heather Irene MD    lactated ringers infusion, 9 mL/hr, Intravenous, Continuous PRN, Jose Mathis MD, Last Rate: 9 mL/hr at 10/30/23 0915, 9 mL/hr at 10/30/23 0915    midazolam (VERSED) injection 1 mg, 1 mg,  Intravenous, Q10 Min PRN, Jose Mathis MD    scopolamine patch 1 mg/72 hr, 1 patch, Transdermal, Continuous, Heather Irene MD, 1 patch at 10/30/23 0915    sodium chloride 0.9 % flush 10 mL, 10 mL, Intravenous, Q12H, Jose Mathis MD    sodium chloride 0.9 % flush 10 mL, 10 mL, Intravenous, PRN, Jose Mathis MD    sodium chloride 0.9 % infusion 40 mL, 40 mL, Intravenous, PRN, Jose Mathis MD    Antibiotics:  Anti-Infectives (From admission, onward)      Ordered     Dose/Rate Route Frequency Start Stop    10/30/23 0833  ceFAZolin 2000 mg IVPB in 100 mL NS (MBP)        Ordering Provider: Heather Irene MD    2,000 mg  over 30 Minutes Intravenous Once 10/30/23 0835              Allergies   Allergen Reactions    Oxycodone Nausea Only and Dizziness     Zofran did not help          Review of Systems:  Constitutional-- No Fever, chills or sweats.  Appetite okay, and no malaise. No fatigue.  HEENT-- No new vision, hearing or throat complaints.  No epistaxis or oral sores.  Denies odynophagia or dysphagia. No headache, photophobia or neck stiffness.  CV-- No chest pain, palpitation or syncope  Resp-- No SOB/cough/Hemoptysis  GI- No nausea, vomiting, or diarrhea.  No hematochezia, melena, or hematemesis. Denies jaundice or chronic liver disease.  -- No dysuria, hematuria, or flank pain.  Denies hesitancy, urgency or flank pain.  Lymph- no swollen lymph nodes in neck/axilla or groin.   Heme- No active bruising or bleeding; no Hx of DVT or PE.  MS-- no swelling or pain in the bones or joints of arms/legs.  No new back pain.  He does not verbalize pain per sister at bedside.  Neuro-- No acute focal weakness or numbness in the arms or legs.  No seizures.  Skin--No rashes.       Physical Exam:   Vital Signs:  Temp (24hrs), Av.8 °F (36.6 °C), Min:97.8 °F (36.6 °C), Max:97.8 °F (36.6 °C)    Temp  Min: 97.8 °F (36.6 °C)  Max: 97.8 °F (36.6 °C)  BP  Min: 123/86  Max: 123/86  Pulse  Min: 94   "Max: 94  Resp  Min: 16  Max: 16  SpO2  Min: 96 %  Max: 96 %    Ht: 65 inches  Wt: 175 lbs  BMI: 29    General: Well developed, well nourished, alert and cooperative, and appears to be in no acute distress.  Head: normocephalic.  HEENT: Normocephalic, atraumatic.  EOMI. No conjunctival injection. No icterus. No nasal discharge.  Heart: Rhythm is regular. There is no peripheral edema, cyanosis or pallor. Extremities are warm and well perfused. Capillary refill is less than 2 seconds.  Lungs: Clear to auscultation bilaterally, Normal respiratory effort. Nonlabored. No dullness.  Abdomen: Soft, nontender, nondistended. No rebound or guarding. NO mass or HSM.    Breast: bilateral breast incision C/D/I no pus or drainage, no hematoma, nipples pink and perfused    Laboratory Data:  Results from last 7 days   Lab Units 10/24/23  0844   WBC 10*3/mm3 6.35   HEMOGLOBIN g/dL 14.9   HEMATOCRIT % 44.7   PLATELETS 10*3/mm3 213     Results from last 7 days   Lab Units 10/24/23  0844   SODIUM mmol/L 141   POTASSIUM mmol/L 3.9   CHLORIDE mmol/L 105   CO2 mmol/L 28.0   BUN mg/dL 8   CREATININE mg/dL 0.66   GLUCOSE mg/dL 104*   CALCIUM mg/dL 9.2     Results from last 7 days   Lab Units 10/24/23  0844   ALK PHOS U/L 73   BILIRUBIN mg/dL 0.6   ALT (SGPT) U/L 17   AST (SGOT) U/L 20                         Estimated Creatinine Clearance: 120.8 mL/min (by C-G formula based on SCr of 0.66 mg/dL).      Lab 10/24/23  0844   HEMOGLOBIN A1C 5.30                Microbiology:  No results found for: \"ACANTHNAEG\", \"AFBCX\", \"BPERTUSSISCX\", \"BLOODCX\"  No results found for: \"BCIDPCR\", \"CXREFLEX\", \"CSFCX\", \"CULTURETIS\"  No results found for: \"CULTURES\", \"HSVCX\", \"URCX\"  No results found for: \"EYECULTURE\", \"GCCX\", \"HSVCULTURE\", \"LABHSV\"  No results found for: \"LEGIONELLA\", \"MRSACX\", \"MUMPSCX\", \"MYCOPLASCX\"  No results found for: \"NOCARDIACX\", \"STOOLCX\"  No results found for: \"THROATCX\", \"UNSTIMCULT\", \"URINECX\", \"CULTURE\", \"VZVCULTUR\"  No results found " "for: \"VIRALCULTU\", \"WOUNDCX\"          Assessment: 38yoF nonsmoker  BRCA 2 positive wanting breast cancer risk reducing surgery undergoing staging for prophylactic mastectomies    Reconstruction Plan  s/p 23 bilateral breast reduction superior pedicle wise pattern excision  after 10/13/23 bilateral NSM by Dr. Heather Irene followed by immediate reconstruction with TE and alloderm  later Removal of TE for implants    Goal: 36D to B cup    Plan  Discussed next stage of reconstruction with bilateral NSM by Dr. Heather Irene followed by immediate breast reconstruction with TE and alloderm.  Pictures obtained  To OR for bilateral NSM with Dr. Romero followed by immediate reconstruction with 12 cm TE and alloderm            Ric Irene MD  10/30/23  09:27 EDT        "

## 2023-10-30 NOTE — OP NOTE
Plastic Surgery Operative Report        Patient Name:  Temitope Plata  :  1985  MRN:  [id number]    Date of Surgery:  10/30/2023       PREOPERATIVE DIAGNOSIS: Bilateral absent breasts,   POSTOPERATIVE DIAGNOSIS: Bilateral absent breasts,      PROCEDURES PERFORMED:  1. Bilateral breast reconstruction with immediate insertion of 12 cm smooth tissue expanders in a prepectoral position filled to 300 cc  2. Use of AlloDerm in breast reconstruction bilaterally.      SURGEON: Ric Irene MD        CPTÆ Codes: 63017, 34078     Staff:  Surgeon(s):  Heather Irene MD Moore, Evan M, MD    Circulator: Merrick Devries RN; Griselda Christensen RN  Physician Assistant: Gissel Kidd PA  Scrub Person: Tessa Aguilera; Kayla Melton  Nursing Assistant: Lou Abbott  Assistant: Andrea Mahoney PA-C     Assistant: Andrea Mahoney PA-C  was responsible for performing the following activities: Retraction, Suction, Irrigation, Suturing, Closing, and Placing Dressing and their skilled assistance was necessary for the success of this case.      Anesthesia: General      Indications for the Procedure: Temitope Plata is a 38yoF nonsmoker  BRCA 2 positive wanting breast cancer risk reducing surgery. Patient underwent a bilateral breast reduction prior to mastectomies. Patient now s/p breast reduction ready for bilateral mastectomies with immediate breast reconstruction starting with tissue expanders.     Operative Findings:  1. Placement of 12 cm tissue expander wrapped in alloderm filled 300 cc      Description of the Procedure:   Patient was seen in preop holding risks and benefits were discussed with the patient who elected to proceed with the procedure. At this point in time H&P was updated, consent was obtained, and the patient was then marked appropriately (including the breast borders and the mastectomy incision patterns). The patient was then taken back to the OR placed in a supine position on the operating  room table and general anesthesia was induced followed by intubation. The patient was prepped and draped in a sterile like fashion. A time out was performed in which the patient and the procedure were gone over. Everyone was in agreement in terms of the patient and the procedure.     Dr. Heather Irene then started the procedure by performing the bilateral mastectomies and the SLNBs. On the sterile back table the tissue expanders were then prepped and wrapped in alloderm. Tissue expanders were then selected checking expiration date to be sure they were not . The alloderms were also selected checking expiration date to be sure they were not . The air was then taken out of the tissue expanders. Injectable normal saline was then placed into the tissue expanders a total of 300 cc into each tissue expander. The alloderm was then prepped in a sterile like fashion. The alloderm was then used to wrap the individual tissue expanders suturing together with 3-0 vicryl and tailoring the ends with tucker scissors. Once the tissue expanders were wrapped in alloderm they were placed in sterile bowels with antibiotic solution and covered until they were needed during the case.     Following the end of the bilateral mastectomies and SLNBs Dr. Heather Irene had completed the procedures at this point Plastic surgery took over the remainder of the case. The patient's skin was then re prepped with betadine paint. The patient was then re draped in a sterile like fashion. New light handle covers, new bovie and new suction were then placed. Working in both breast pockets. The pockets were then irrigated with warm normal saline. Hemostasis was then checked with bovie cautery taking care not to burn the skin flaps. Once hemostasis was obtained the pockets were irrigated with antibiotic solution.     The tissue expanders wrapped in alloderm were then placed into the breast pockets in a prepectoral position and the suture tabs  were sutured in place with 2-0 silk suture. Top gloves were changed during each handling of the tissue expanders. One 15 Croatian drains were placed into each breast pocket. Drains were sutured in place with 3-0 nylon suture. Biopatches were placed around the drains. The mastectomies skin edges were then freshened with a 10 blade. The mastectomies incision was then closed by deep 3-0 monocryls, deep dermal 3-0 monocryls followed by running 3-0 stratifix suture. The drain sites were then dressed. Drapes were taken down. At this point in time the procedure had finished. Patient was the extubated and taken to PACU for a full recovery.          Implants:    Implant Name Type Inv. Item Serial No.  Lot No. LRB No. Used Action   DEV CONTRL TISS STRATAFIX SPIRAL MNCRYL UD 3/0 PLS 30CM - [id number] Implant DEV CONTRL TISS STRATAFIX SPIRAL MNCRYL UD 3/0 PLS 30CM  [facility]  DIV OF J AND J [id number] Left 2 Implanted   EXPNDR TISS BRST F/HT XPROJ STL 133S FX/T 450CC - [id number] - [id number] Implant EXPNDR TISS BRST F/HT XPROJ STL 133S FX/T 450CC [id number] [facility] [facility]  Right 1 Implanted   GRFT TISS ALLODERM RTM PERF [id number] 2.4MM/THK .4MM - [id number] Implant GRFT TISS ALLODERM RTM PERF [id number] 2.4MM/THK .4MM  [employer] [facility] [id number] Right 1 Implanted   EXPNDR TISS BRST F/HT XPROJ STL 133S FX/T 450CC - [id number] - [id number] Implant EXPNDR TISS BRST F/HT XPROJ STL 133S FX/T 450CC [id number] [employer]  Left 1 Implanted   GRFT TISS ALLODERM RTM PERF [id number] 2.4MM/THK .4MM - [id number] Implant GRFT TISS ALLODERM RTM PERF [id number] 2.4MM/THK .4MM  [employer] [id number] Left 1 Implanted   CLIPAPPLR M/ ENDO LIGACLIP 20CLP 11IN MD Estella [id number] Implant CLIPAPPLR M/ ENDO LIGACLIP 20CLP 11IN MD  [facility] ENDO SURGERY  DIV OF J AND J [id number] Right 1 Implanted   CLIPAPPLR M/ ENDO LIGACLIP9 3/8IN MD - [id number] Implant CLIPAPPLR M/ ENDO  LIGACLIP9 3/8IN MD  [facility]  DIV OF J AND J [id number] Left 1 Implanted       Specimen Removed: Rt and Lt breast tissue          Estimated Blood Loss: 10 cc plastic surgery portion     Drains Placed: 2 drains total placed, One in each breast     Complications: none immediate      Ric Ireen MD     Date: 10/30/2023  Time: 16:41 EDT

## 2023-10-30 NOTE — H&P
"  Pre-Op H&P  Temitope Plata  [id number]  1985      Chief complaint: BRCA 2 +      Subjective:  Patient is a 38 y.o.female presents for scheduled surgery by Dr. Irene. She anticipates a BREAST NIPPLE SPARING MASTECTOMY- BILATERAL; IMMEDIATE BREAST RECONSTRUCTION WITH BREAST TISSUE EXPANDER AND ALLODERM PLACEMENT - BILATERAL  today. She is found to be BRCA 2 + and wants breast cancer risk reducing surgery.      Review of Systems:  Constitutional-- No fever, chills or sweats. No fatigue.  CV-- No chest pain, palpitation or syncope  Resp-- No SOB, cough, hemoptysis  Skin--No rashes or lesions      Allergies: No Known Allergies      Home Meds:  Medications Prior to Admission   Medication Sig Dispense Refill Last Dose    desvenlafaxine (Pristiq) 50 MG 24 hr tablet Take 1 tablet by mouth Daily. Start after finishing 25mg tablet. 30 tablet 5          PMH:   Past Medical History:   Diagnosis Date    BRCA2 gene mutation positive     Hemorrhoid     History of medical problems     BRCA-2 mutation    Ovarian cyst      PSH:    Past Surgical History:   Procedure Laterality Date     SECTION N/A 2020    Procedure:  SECTION PRIMARY;  Surgeon: Cristel Figueroa MD;  Location: [facility] LABOR DELIVERY;  Service: Obstetrics/Gynecology;  Laterality: N/A;    OVARIAN CYST REMOVAL      SALPINGECTOMY Bilateral     DURING C SECTION    WISDOM TOOTH EXTRACTION         Social History:   Tobacco:   Social History     Tobacco Use   Smoking Status Never   Smokeless Tobacco Never   Tobacco Comments    None      Alcohol:     Social History     Substance and Sexual Activity   Alcohol Use Yes    Alcohol/week: 1.0 - 2.0 standard drink of alcohol    Types: 1 - 2 Glasses of wine per week    Comment: or less         Physical Exam:/86 (BP Location: Right arm, Patient Position: Sitting)   Pulse 94   Temp 97.8 °F (36.6 °C) (Temporal)   Resp 16   Ht 165.1 cm (65\")   Wt 79.9 kg (176 lb 4.1 oz)   SpO2 96%   BMI " 29.33 kg/m²       General Appearance:    Alert, cooperative, no distress, appears stated age   Head:    Normocephalic, without obvious abnormality, atraumatic   Lungs:     Clear to auscultation bilaterally, respirations unlabored    Heart:   Regular rate and rhythm, S1 and S2 normal    Abdomen:    Soft without tenderness   Extremities:   Extremities normal, atraumatic, no cyanosis or edema   Skin:   Skin color, texture, turgor normal, no rashes or lesions   Neurologic:   Grossly intact     Results Review:     LABS:  Lab Results   Component Value Date    WBC 6.35 10/24/2023    HGB 14.9 10/24/2023    HCT 44.7 10/24/2023    MCV 93.3 10/24/2023     10/24/2023    NEUTROABS 10.78 (H) 02/20/2020    GLUCOSE 104 (H) 10/24/2023    BUN 8 10/24/2023    CREATININE 0.66 10/24/2023     10/24/2023    K 3.9 10/24/2023     10/24/2023    CO2 28.0 10/24/2023    CALCIUM 9.2 10/24/2023    ALBUMIN 4.0 10/24/2023    AST 20 10/24/2023    ALT 17 10/24/2023    BILITOT 0.6 10/24/2023       RADIOLOGY:  Imaging Results (Last 72 Hours)       ** No results found for the last 72 hours. **            I reviewed the patient's new clinical results.    Cancer Staging (if applicable)  Cancer Patient: __ yes __no __unknown; If yes, clinical stage T:__ N:__M:__, stage group or __N/A      Impression: Genetic susceptibility to malignant neoplasm of breast       Plan: BREAST NIPPLE SPARING MASTECTOMY- BILATERAL; IMMEDIATE BREAST RECONSTRUCTION WITH BREAST TISSUE EXPANDER AND ALLODERM PLACEMENT - BILATERAL       ANNIE Feliciano   10/30/2023   09:17 EDT

## 2023-10-30 NOTE — OP NOTE
Mastectomy Operative Note    Temitope Plata  [id number]  1985    Date of Surgery:  10/30/2023 14:30 EDT    Pre-op Diagnosis: BRCA mutation    Post-op Diagnosis: BRCA mutation      Procedure(s):  Bilateral NIPPLE SPARING MASTECTOMY    Anesthesia: General, jalyn pec blocks    This procedure was not performed to treat breast cancer through sentinel node biopsy       Surgeon: Heather Irene MD    Assistant:   CARLOS ALBERTO Palmer assisted with retraction, irrigation    Estimated Blood Loss: 25ml    Specimens:        Specimens       ID Source Type Tests Collected By Collected At Frozen?    A Breast, Left Tissue TISSUE PATHOLOGY EXAM   Heather Irene MD 10/30/23 1241 No    Description: SHORT STITCH SUB-AREOLAR TISSUE; LONG STITCH AXILLARY TAIL    This specimen was not marked as sent.    B Breast, Right Tissue TISSUE PATHOLOGY EXAM   Heather Irene MD 10/30/23 1245 No    Description: SHORT STITCH SUB-AREOLAR TISSUE; LONG STITCH AXILLARY TAIL    This specimen was not marked as sent.                Findings: no gross abnormalities, skin and NAC viable    Complications: none    History: Pt with BRCA mutation who elected for jalyn NSM. She underwent first stage bilateral mastopexy a few months ago. She understands risks and informed consent obtained.    Description of Procedure: Pt brought to OR and placed in supine position.  GETA and bilateral pectoralis block performed.  Time out performed.  Pt prepped and draped.  I began by marking 11cm IM fold planned Incisions in patients prior scar.  I began on left and made incision and continued down using cautery to the pectoralis. I developed the posterior flap first and then moved to anterior. Anterior flap was developed superiorly to just below the clavicle, medially to lateral sternum, inferior to below fold, and laterally to latissimus. Tissue at sub nipple-areolar location was marked with a short stitch. Breast was taken off the pec and axillary tail was marked with a  long stitch.  Site was irrigated and hemostasis confirmed. I then moved to the right and made a symmetrical incision and continued down to the pec and developed the posterior plane first.  Anterior flap was developed superiorly to just below the clavicle, medially to lateral sternum, inferior to below fold, and laterally to latissimus. Tissue at sub nipple-areolar location was marked with a short stitch. Breast was taken off the pec and axillary tail which was more prominent on right and dissection went to the manny basin but nodes left and it was marked with a long stitch.  Site was irrigated and hemostasis confirmed.  Both skin and bilateral NAC were pink and viable at completion of my portion and patient was turned over to Ric Irene to complete recon.           Heather Irene MD     Date: 10/30/2023  Time: 14:30 EDT

## 2023-10-31 ENCOUNTER — READMISSION MANAGEMENT (OUTPATIENT)
Dept: CALL CENTER | Facility: HOSPITAL | Age: 38
End: 2023-10-31
Payer: COMMERCIAL

## 2023-10-31 VITALS
HEART RATE: 87 BPM | SYSTOLIC BLOOD PRESSURE: 106 MMHG | WEIGHT: 176.26 LBS | HEIGHT: 65 IN | RESPIRATION RATE: 16 BRPM | DIASTOLIC BLOOD PRESSURE: 72 MMHG | TEMPERATURE: 98.1 F | BODY MASS INDEX: 29.37 KG/M2 | OXYGEN SATURATION: 97 %

## 2023-10-31 PROCEDURE — G0378 HOSPITAL OBSERVATION PER HR: HCPCS

## 2023-10-31 RX ADMIN — TRAMADOL HYDROCHLORIDE 50 MG: 50 TABLET ORAL at 02:13

## 2023-10-31 RX ADMIN — MELOXICAM 15 MG: 7.5 TABLET ORAL at 09:03

## 2023-10-31 RX ADMIN — CYCLOBENZAPRINE 10 MG: 10 TABLET, FILM COATED ORAL at 06:18

## 2023-10-31 RX ADMIN — ACETAMINOPHEN 1000 MG: 500 TABLET ORAL at 06:01

## 2023-10-31 NOTE — PLAN OF CARE
Goal Outcome Evaluation:  Plan of Care Reviewed With: patient        Progress: no change  Outcome Evaluation: VSS. Pain controllled with oral meds. UOP-WNL, I&O cathed X1, after that pt able to urinate on her own. Pt slept well. Slight dizziness when getting up for the first time, however that has since resolved. Will continue to monitor. Anticipate d/c later today.

## 2023-10-31 NOTE — OUTREACH NOTE
Prep Survey      Flowsheet Row Responses   [facility] patient discharged from? [place]   Is LACE score < 7 ? Yes   Eligibility [facility]   Date of Admission 10/30/23   Date of Discharge 10/31/23   Discharge Disposition Home or Self Care   Discharge diagnosis BRCA 2 positiver bilateral mastectomies immediate reconstruction.   Does the patient have one of the following disease processes/diagnoses(primary or secondary)? General Surgery   Does the patient have Home health ordered? No   Is there a DME ordered? No   Prep survey completed? Yes            KIMBERLEY BULL - Registered Nurse

## 2023-10-31 NOTE — PROGRESS NOTES
No complaints. Overall good night. Pain controlled. Told po.    AF VSS    Flaps and NAC healthy and viable jalyn  ABHI's approp    A/P: stable post-op, ok to D/c

## 2023-10-31 NOTE — DISCHARGE SUMMARY
History of Present Illness:  Temitope Plata is a 38yoF nonsmoker  BRCA 2 positive wanting breast cancer risk reducing surgery.     Patient states father with kidney cancer. Patient went to a  and underwent genetic testing. Patient came back BRCA 2 positive. Patient has two children. Patient was referred to Dr. Heather Irene prophylactic breast cancer treatment. Patient has elected for bilateral mastectomies. Patient is interested in immediate reconstruction. Patient is nonsmoker. Has no hx of breast surgery. Patient interested in smaller breast.  Patient was seen in clinic and found appropriate for staged approach to reconstruction. Patient is a candidate for breast reduction/lift prior to mastectomies for NSM with TE and alloderm followed by lateral implants. Patient was in agreement. On 23 patient underwent bilateral breast reduction. Patient recovered well no complications. Patient now ready for mastectomies with immediate breast reconstruction.       Date of Surgery:  10/30/2023 14:30 EDT     Pre-op Diagnosis: BRCA mutation     Post-op Diagnosis: BRCA mutation        Procedure(s):  Bilateral NIPPLE SPARING MASTECTOMY    Date of Surgery:  10/30/2023        PREOPERATIVE DIAGNOSIS: Bilateral absent breasts,   POSTOPERATIVE DIAGNOSIS: Bilateral absent breasts,      PROCEDURES PERFORMED:  1. Bilateral breast reconstruction with immediate insertion of 12 cm smooth tissue expanders in a prepectoral position filled to 300 cc  2. Use of AlloDerm in breast reconstruction bilaterally.      SURGEON: Ric Irene MD        CPTÆ Codes: 59733, 30251       PE:  Gen: NAD  Breast: nipples Pink and perfused, TE in place, incision C/D/I drains in place, minimal ecchymosis.     Hospital Course  Patient was a planned admit after her surgery. Patient did well overnight. Patient was seen in AM. Patient was voiding, ambulating, tolerating a PO diet with pain controlled. Patient was seen and found appropriate for  discharge.

## 2023-10-31 NOTE — DISCHARGE INSTRUCTIONS
May shower no baths  No heavy lifting  Drain care  Scripts at home  Follow up with Dr. Ric Irene  as scheduled next week.

## 2023-11-01 ENCOUNTER — TRANSITIONAL CARE MANAGEMENT TELEPHONE ENCOUNTER (OUTPATIENT)
Dept: CALL CENTER | Facility: HOSPITAL | Age: 38
End: 2023-11-01
Payer: COMMERCIAL

## 2023-11-01 NOTE — OUTREACH NOTE
Call Center TCM Note      Flowsheet Row Responses   [facility] patient discharged from? [place]   Does the patient have one of the following disease processes/diagnoses(primary or secondary)? General Surgery   TCM attempt successful? Yes   Call start time 1319   Call end time 1321   Discharge diagnosis BRCA 2 positiver bilateral mastectomies immediate reconstruction.   Meds reviewed with patient/caregiver? Yes   Does the patient have all medications related to this admission filled (includes all antibiotics, pain medications, etc.) N/A   Is the patient taking all medications as directed (includes completed medication regime)? Yes   Comments HOSP DC FU appt 11/6/23 845 am   Does the patient have an appointment with their PCP within 7-14 days of discharge? Yes   Has home health visited the patient within 72 hours of discharge? N/A   Psychosocial issues? No   Did the patient receive a copy of their discharge instructions? Yes   Nursing interventions Reviewed instructions with patient   What is the patient's perception of their health status since discharge? Improving   Is the patient /caregiver able to teach back basic post-op care? Lifting as instructed by MD in discharge instructions, No tub bath, swimming, or hot tub until instructed by MD, Take showers only when approved by MD-sponge bathe until then   Is the patient/caregiver able to teach back signs and symptoms of incisional infection? Increased redness, swelling or pain at the incisonal site, Increased drainage or bleeding, Incisional warmth, Pus or odor from incision, Fever   Is the patient/caregiver able to teach back steps to recovery at home? Eat a well-balance diet, Set small, achievable goals for return to baseline health   Is the patient/caregiver able to teach back the hierarchy of who to call/visit for symptoms/problems? PCP, Specialist, Home health nurse, Urgent Care, ED, 911 Yes   TCM call completed? Yes   Wrap up additional comments Pt  reports she is doing ok. States she has a blister forming on left side of breast and has already called surgeon office. No needs.   Call end time 1321            Eli Hutchinson RN    11/1/2023, 13:21 EDT

## 2023-11-06 ENCOUNTER — LAB (OUTPATIENT)
Dept: LAB | Facility: HOSPITAL | Age: 38
End: 2023-11-06
Payer: COMMERCIAL

## 2023-11-06 ENCOUNTER — OFFICE VISIT (OUTPATIENT)
Dept: INTERNAL MEDICINE | Facility: CLINIC | Age: 38
End: 2023-11-06
Payer: COMMERCIAL

## 2023-11-06 VITALS
HEART RATE: 87 BPM | DIASTOLIC BLOOD PRESSURE: 70 MMHG | HEIGHT: 65 IN | TEMPERATURE: 97.2 F | OXYGEN SATURATION: 99 % | WEIGHT: 176 LBS | BODY MASS INDEX: 29.32 KG/M2 | SYSTOLIC BLOOD PRESSURE: 110 MMHG

## 2023-11-06 DIAGNOSIS — Z90.13 STATUS POST BILATERAL MASTECTOMY: ICD-10-CM

## 2023-11-06 DIAGNOSIS — Z90.13 STATUS POST BILATERAL MASTECTOMY: Primary | ICD-10-CM

## 2023-11-06 DIAGNOSIS — T14.8XXA BLISTER: ICD-10-CM

## 2023-11-06 DIAGNOSIS — E55.9 VITAMIN D DEFICIENCY: ICD-10-CM

## 2023-11-06 DIAGNOSIS — Z00.00 ROUTINE GENERAL MEDICAL EXAMINATION AT A HEALTH CARE FACILITY: ICD-10-CM

## 2023-11-06 LAB
25(OH)D3 SERPL-MCNC: 20.8 NG/ML (ref 30–100)
ALBUMIN SERPL-MCNC: 4 G/DL (ref 3.5–5.2)
ALBUMIN/GLOB SERPL: 1.4 G/DL
ALP SERPL-CCNC: 73 U/L (ref 39–117)
ALT SERPL W P-5'-P-CCNC: 26 U/L (ref 1–33)
ANION GAP SERPL CALCULATED.3IONS-SCNC: 9 MMOL/L (ref 5–15)
AST SERPL-CCNC: 29 U/L (ref 1–32)
BILIRUB SERPL-MCNC: 0.3 MG/DL (ref 0–1.2)
BUN SERPL-MCNC: 7 MG/DL (ref 6–20)
BUN/CREAT SERPL: 9.6 (ref 7–25)
CALCIUM SPEC-SCNC: 9.4 MG/DL (ref 8.6–10.5)
CHLORIDE SERPL-SCNC: 103 MMOL/L (ref 98–107)
CHOLEST SERPL-MCNC: 182 MG/DL (ref 0–200)
CO2 SERPL-SCNC: 27 MMOL/L (ref 22–29)
CREAT SERPL-MCNC: 0.73 MG/DL (ref 0.57–1)
DEPRECATED RDW RBC AUTO: 38.8 FL (ref 37–54)
EGFRCR SERPLBLD CKD-EPI 2021: 108.1 ML/MIN/1.73
ERYTHROCYTE [DISTWIDTH] IN BLOOD BY AUTOMATED COUNT: 11.6 % (ref 12.3–15.4)
GLOBULIN UR ELPH-MCNC: 2.8 GM/DL
GLUCOSE SERPL-MCNC: 95 MG/DL (ref 65–99)
HBA1C MFR BLD: 5.3 % (ref 4.8–5.6)
HCT VFR BLD AUTO: 42 % (ref 34–46.6)
HDLC SERPL-MCNC: 66 MG/DL (ref 40–60)
HGB BLD-MCNC: 14.2 G/DL (ref 12–15.9)
IRON 24H UR-MRATE: 149 MCG/DL (ref 37–145)
IRON SATN MFR SERPL: 43 % (ref 20–50)
LDLC SERPL CALC-MCNC: 102 MG/DL (ref 0–100)
LDLC/HDLC SERPL: 1.54 {RATIO}
MCH RBC QN AUTO: 31.1 PG (ref 26.6–33)
MCHC RBC AUTO-ENTMCNC: 33.8 G/DL (ref 31.5–35.7)
MCV RBC AUTO: 92.1 FL (ref 79–97)
PLATELET # BLD AUTO: 244 10*3/MM3 (ref 140–450)
PMV BLD AUTO: 11.1 FL (ref 6–12)
POTASSIUM SERPL-SCNC: 4.6 MMOL/L (ref 3.5–5.2)
PROT SERPL-MCNC: 6.8 G/DL (ref 6–8.5)
RBC # BLD AUTO: 4.56 10*6/MM3 (ref 3.77–5.28)
SODIUM SERPL-SCNC: 139 MMOL/L (ref 136–145)
TIBC SERPL-MCNC: 346 MCG/DL (ref 298–536)
TRANSFERRIN SERPL-MCNC: 232 MG/DL (ref 200–360)
TRIGL SERPL-MCNC: 73 MG/DL (ref 0–150)
TSH SERPL DL<=0.05 MIU/L-ACNC: 1 UIU/ML (ref 0.27–4.2)
VIT B12 BLD-MCNC: 1092 PG/ML (ref 211–946)
VLDLC SERPL-MCNC: 14 MG/DL (ref 5–40)
WBC NRBC COR # BLD: 7.37 10*3/MM3 (ref 3.4–10.8)

## 2023-11-06 PROCEDURE — 82607 VITAMIN B-12: CPT

## 2023-11-06 PROCEDURE — 83036 HEMOGLOBIN GLYCOSYLATED A1C: CPT

## 2023-11-06 PROCEDURE — 84466 ASSAY OF TRANSFERRIN: CPT

## 2023-11-06 PROCEDURE — 83540 ASSAY OF IRON: CPT

## 2023-11-06 PROCEDURE — 82306 VITAMIN D 25 HYDROXY: CPT

## 2023-11-06 PROCEDURE — 80050 GENERAL HEALTH PANEL: CPT

## 2023-11-06 PROCEDURE — 80061 LIPID PANEL: CPT

## 2023-11-06 NOTE — PROGRESS NOTES
Transitional Care Follow Up Visit  Subjective     Temitope Plata is a 38 y.o. female who presents for a transitional care management visit.    Within 48 business hours after discharge our office contacted her via telephone to coordinate her care and needs.      I reviewed and discussed the details of that call along with the discharge summary, hospital problems, inpatient lab results, inpatient diagnostic studies, and consultation reports with Temitope.     Current outpatient and discharge medications have been reconciled for the patient.  Reviewed by: Viridiana Pearson MD          10/31/2023     5:01 PM   Date of TCM Phone Call   [facility]   Date of Admission 10/30/2023   Date of Discharge 10/31/2023   Discharge Disposition Home or Self Care     Risk for Readmission (LACE) Score: 1 (10/31/2023  6:00 AM)      History of Present Illness   Temitope presents for a hospital follow up, ports doing well except for some blister formation and attention areas.  Also notes that the expanders are uncomfortable and is slowly getting used to them.  She is not taking any pain medication other than Tylenol due to her constipation.  Does note some crusting/dryness in her left nipple and is concerned about blood supply is used.  Plan for the next step in reconstruction in January.    Course During Hospital Stay:  On 7/12/23 Temitope underwent bilateral breast reduction. Patient recovered well no complications. Patient now ready for mastectomies with immediate breast reconstruction.      PREOPERATIVE DIAGNOSIS: Bilateral absent breasts,   POSTOPERATIVE DIAGNOSIS: Bilateral absent breasts,      PROCEDURES PERFORMED:  1. Bilateral breast reconstruction with immediate insertion of 12 cm smooth tissue expanders in a prepectoral position filled to 300 cc  2. Use of AlloDerm in breast reconstruction bilaterally.      The following portions of the patient's history were reviewed and updated as appropriate:  allergies, current medications, past family history, past medical history, past social history, past surgical history, and problem list.    Review of Systems   Constitutional:  Positive for fatigue. Negative for chills and fever.   HENT:  Negative for ear discharge, ear pain, sinus pressure and sore throat.    Respiratory:  Negative for cough, chest tightness and shortness of breath.    Cardiovascular:  Positive for chest pain. Negative for palpitations and leg swelling.   Gastrointestinal:  Negative for diarrhea, nausea and vomiting.   Musculoskeletal:  Negative for arthralgias, back pain and myalgias.   Skin:  Positive for color change and wound.   Neurological:  Negative for dizziness, syncope and headaches.   Psychiatric/Behavioral:  Negative for confusion and sleep disturbance.        Objective   Physical Exam  Vitals and nursing note reviewed.   Constitutional:       Appearance: She is well-developed.   HENT:      Head: Normocephalic and atraumatic.      Right Ear: External ear normal.      Left Ear: External ear normal.      Mouth/Throat:      Pharynx: No oropharyngeal exudate.   Eyes:      Conjunctiva/sclera: Conjunctivae normal.      Pupils: Pupils are equal, round, and reactive to light.   Neck:      Thyroid: No thyromegaly.   Cardiovascular:      Rate and Rhythm: Normal rate and regular rhythm.      Pulses: Normal pulses.      Heart sounds: Normal heart sounds. No murmur heard.     No friction rub. No gallop.   Pulmonary:      Effort: Pulmonary effort is normal.      Breath sounds: Normal breath sounds.   Chest:   Breasts:     Right: No nipple discharge.      Left: Skin change (1.5cm blister - dry scabby, wit mild erythema from adhesive, margins wnl) present. No mass or nipple discharge.       Abdominal:      General: Bowel sounds are normal. There is no distension.      Palpations: Abdomen is soft.      Tenderness: There is no abdominal tenderness.   Musculoskeletal:      Cervical back: Neck supple.  "  Skin:     General: Skin is warm and dry.      Findings: Rash present.   Neurological:      Mental Status: She is alert and oriented to person, place, and time.      Cranial Nerves: No cranial nerve deficit.   Psychiatric:         Judgment: Judgment normal.             Current Outpatient Medications:     desvenlafaxine (Pristiq) 50 MG 24 hr tablet, Take 1 tablet by mouth Daily. Start after finishing 25mg tablet., Disp: 30 tablet, Rfl: 5    lidocaine (XYLOCAINE) 2 % jelly, Apply 1 application topically to the appropriate area as directed 2 (Two) Times a Day., Disp: 85 g, Rfl: 1    mupirocin (BACTROBAN) 2 % ointment, Apply 1 application topically to the appropriate area (wound area) as directed 2 Times a Day until healed, Disp: 22 g, Rfl: 0      /70   Pulse 87   Temp 97.2 °F (36.2 °C)   Ht 165.1 cm (65\")   Wt 79.8 kg (176 lb)   LMP 10/26/2023 (Approximate)   SpO2 99% Comment: ra  BMI 29.29 kg/m²       Results for orders placed or performed during the hospital encounter of 10/30/23   Potassium    Specimen: Blood   Result Value Ref Range    Potassium 3.9 3.5 - 5.2 mmol/L   POC Pregnancy, Urine    Specimen: Urine   Result Value Ref Range    HCG, Urine, QL Negative Negative    Lot Number [id number]     Internal Positive Control Positive Positive, Passed    Internal Negative Control Negative Negative, Passed    Expiration Date 1/30/25    Tissue Pathology Exam    Specimen: A: Breast, Left; Tissue    B: Breast, Right; Tissue   Result Value Ref Range    Case Report       Surgical Pathology Report                         Case: [id number]                                  Authorizing Provider:  Heather Irene MD        Collected:           10/30/2023 12:41 PM          Ordering Location:     [facility]   Received:            10/31/2023 09:02 AM                                 OR                                                                           Pathologist:           Barry Hutton MD              " "                                               Specimens:   1) - Breast, Left, SHORT STITCH SUB-AREOLAR TISSUE; LONG STITCH AXILLARY TAIL                       2) - Breast, Right, SHORT STITCH SUB-AREOLAR TISSUE; LONG STITCH AXILLARY TAIL             Clinical Information       BRCA positive      Final Diagnosis       BREAST, LEFT, SKIN AND NIPPLE SPARING SIMPLE MASTECTOMY:  Benign breast parenchyma with mild fibrocystic change  Negative for atypical ductal hyperplasia, DCIS, or invasive carcinoma  2.   BREAST, RIGHT, SKIN AND NIPPLE SPARING SIMPLE MASTECTOMY:  Breast parenchyma with mild fibrocystic change and fat necrosis with foreign body giant cell reaction  Negative for atypical ductal hyperplasia, DCIS, or invasive carcinoma        Gross Description       1. Breast, Left.  Received in formalin labeled \"left breast\" is a 719.8 g, 16 x 15.5 x 5.5 cm intact skin and nipple sparing simple mastectomy specimen with focal skeletal muscle attached to the deep aspect.  The specimen is oriented by the surgeon with a short stitch subareolar and long stitch axillary tail.  The superficial margin is inked green, the deep margin is inked black and sectioning from superior to inferior reveals a fat to fibrous ratio of approximately 80: 20.  No distinct masses, biopsy cavities or biopsy clips are present.  There are no lymph nodes in the outer quadrant or within the breast parenchyma.  The cold ischemic time is 42 minutes and the total time in formalin is greater than 6 and less than 72 hours.  Representative sections are submitted as follows:  1A-1B: Upper outer quadrant  1C-1D: Lower outer quadrant  1E-1F: Lower inner quadrant  1G-1H: Upper inner quadrant  1I-1J: Subareolar    2. Breast, Right.  Received in formalin labeled \"right breast\" is an 826.5 g, 19.5 x 18 x 5.5 cm intact skin and nipple sparing simple mastectomy specimen with focal skeletal muscle attached to the deep aspect.  The specimen is oriented by the surgeon " with a short stitch subareolar and long stitch axillary tail.  The superficial margin is inked green, the deep margin is inked black and sectioning from superior to inferior reveals a fat to fibrous ratio of approximately 80: 20.  Within the lower outer quadrant are a few scattered areas of gonzalez yellow discoloration consistent with possible fat necrosis.  No distinct masses, biopsy cavities or biopsy clips are identified.  There are no lymph nodes in the outer quadrant or within the breast parenchyma.  The cold ischemic time is 1 hour and 20 minutes and the total time in formalin is greater than 6 and less than 72 hours.  Representative sections are submitted as follows:  2A-2B: Upper outer quadrant  2C-2D: Lower outer quadrant with fat necrosis  2E-2F: Lower inner quadrant  2G-2H: Upper inner quadrant   2I-2J: Subareolar HDM        Microscopic Description       The slides are reviewed and demonstrate histopathologic features supporting the above rendered diagnosis.                 Assessment & Plan   Diagnoses and all orders for this visit:    Status post bilateral mastectomy  -     CBC (No Diff); Future  -     Iron Profile; Future  -     Basic Metabolic Panel; Future    Blister  -     CBC (No Diff); Future  -     mupirocin (BACTROBAN) 2 % ointment; Apply 1 application topically to the appropriate area (wound area) as directed 2 Times a Day until healed  -     lidocaine (XYLOCAINE) 2 % jelly; Apply 1 application topically to the appropriate area as directed 2 (Two) Times a Day.      Reassured that blister is healing and does not have any signs of infection.  She has an appointment with Dr. Irene her surgeon tomorrow.  To discuss the nipple concern with her.      Current outpatient and discharge medications have been reconciled for the patient.  Reviewed by: Viridiana Pearson MD      Return for Next scheduled follow up, Annual.    Electronically signed by:    Viridiana Pearson MD

## 2023-11-08 ENCOUNTER — PATIENT MESSAGE (OUTPATIENT)
Dept: INTERNAL MEDICINE | Facility: CLINIC | Age: 38
End: 2023-11-08
Payer: COMMERCIAL

## 2023-11-08 DIAGNOSIS — M54.9 BACK PAIN, UNSPECIFIED BACK LOCATION, UNSPECIFIED BACK PAIN LATERALITY, UNSPECIFIED CHRONICITY: Primary | ICD-10-CM

## 2023-11-08 RX ORDER — AZITHROMYCIN 250 MG/1
TABLET, FILM COATED ORAL
Qty: 6 TABLET | Refills: 0 | Status: SHIPPED | OUTPATIENT
Start: 2023-11-08

## 2023-11-08 RX ORDER — BENZONATATE 200 MG/1
200 CAPSULE ORAL 3 TIMES DAILY PRN
Qty: 30 CAPSULE | Refills: 0 | Status: SHIPPED | OUTPATIENT
Start: 2023-11-08

## 2023-11-08 RX ORDER — ERGOCALCIFEROL 1.25 MG/1
50000 CAPSULE ORAL WEEKLY
Qty: 12 CAPSULE | Refills: 1 | Status: SHIPPED | OUTPATIENT
Start: 2023-11-08

## 2023-11-08 NOTE — TELEPHONE ENCOUNTER
From: Temitope Plata  To: Viridiana Pearson  Sent: 11/8/2023 12:27 PM EST  Subject: cough/congestion    Hi Dr Pearson - since yesterday, I have developed cold symptoms that Dorie has brought home. I have a productive cough with green/yellowish sputum, no fever, negative Covid test this morning. The cough is the worst, simply because it causes sharp pains in surgical incisions, more so on my left side. Should I take OTC cough meds, or something different? Thanks, Temitope

## 2023-11-13 RX ORDER — CYCLOBENZAPRINE HCL 10 MG
10 TABLET ORAL 3 TIMES DAILY PRN
Qty: 90 TABLET | Refills: 2 | Status: SHIPPED | OUTPATIENT
Start: 2023-11-13

## 2023-12-07 ENCOUNTER — OFFICE VISIT (OUTPATIENT)
Dept: ORTHOPEDIC SURGERY | Facility: CLINIC | Age: 38
End: 2023-12-07
Payer: COMMERCIAL

## 2023-12-07 VITALS
DIASTOLIC BLOOD PRESSURE: 82 MMHG | BODY MASS INDEX: 28.56 KG/M2 | HEIGHT: 65 IN | WEIGHT: 171.4 LBS | SYSTOLIC BLOOD PRESSURE: 128 MMHG

## 2023-12-07 DIAGNOSIS — M65.312 TRIGGER THUMB OF LEFT HAND: Primary | ICD-10-CM

## 2023-12-07 PROCEDURE — 20550 NJX 1 TENDON SHEATH/LIGAMENT: CPT | Performed by: PHYSICIAN ASSISTANT

## 2023-12-07 RX ADMIN — LIDOCAINE HYDROCHLORIDE 0.5 ML: 10 INJECTION, SOLUTION EPIDURAL; INFILTRATION; INTRACAUDAL; PERINEURAL at 08:49

## 2023-12-07 RX ADMIN — TRIAMCINOLONE ACETONIDE 20 MG: 40 INJECTION, SUSPENSION INTRA-ARTICULAR; INTRAMUSCULAR at 08:49

## 2023-12-07 NOTE — PROGRESS NOTES
Procedure   - Hand/Upper Extremity Injection: L thumb A1 for trigger finger on 12/7/2023 8:49 AM  Indications: pain  Details: 25 G needle, volar approach  Medications: 0.5 mL lidocaine PF 1% 1 %; 20 mg triamcinolone acetonide 40 MG/ML  Outcome: tolerated well, no immediate complications  Procedure, treatment alternatives, risks and benefits explained, specific risks discussed. Consent was given by the patient. Immediately prior to procedure a time out was called to verify the correct patient, procedure, equipment, support staff and site/side marked as required. Patient was prepped and draped in the usual sterile fashion.

## 2023-12-07 NOTE — PROGRESS NOTES
"    [facility] Note        Subjective     CC: Follow-up (4.5 months follow up -- Thumb pain, left,  Trigger thumb of left hand )      SUSHILA Plata is a 38 y.o. female.  Patient returns for follow-up left thumb trigger digit.  She received an injection 7/28/2023 which worked well until about 2 weeks ago.  Now symptoms are returning and she is here today requesting repeat injection.    Pain scale: 3/10.  Associated symptoms pain and popping/catching.    Overall, patient's symptoms are as above.  Patient did recently undergo bilateral mastectomy in October 23.  She currently has expanders in.  Reports that she will be undergoing removal of expanders/reconstruction late January/early February 2024.    ROS:    Constiutional:Pt denies fever, chills, nausea, or vomiting.  MSK:as above        Objective      Past Medical History  Past Medical History:   Diagnosis Date    BRCA2 gene mutation positive     Hemorrhoid     History of medical problems     BRCA-2 mutation    Ovarian cyst      Social History     Socioeconomic History    Marital status:    Tobacco Use    Smoking status: Never    Smokeless tobacco: Never    Tobacco comments:     None   Vaping Use    Vaping Use: Never used   Substance and Sexual Activity    Alcohol use: Yes     Alcohol/week: 1.0 - 2.0 standard drink of alcohol     Types: 1 - 2 Glasses of wine per week     Comment: or less    Drug use: Never    Sexual activity: Yes     Partners: Male     Birth control/protection: Surgical     Comment: Salpingectomy, Feb 2020          Physical Exam  /82   Ht 165.1 cm (65\")   Wt 77.7 kg (171 lb 6.4 oz)   BMI 28.52 kg/m²     Body mass index is 28.52 kg/m².    Patient is well nourished and well developed.        Ortho Exam  Left thumb  Skin: intact without rash, redness, warmth, swelling.  Tenderness: Positive over thumb A1 pulley, thickening/nodule noted.  ROM: FROM thumb with triggering reproduced during " exam  Motor/sensory: Grossly intact C5-T1.  Vascular: 2+ radial pulse, brisk CR each digit       Imaging/Labs/EMG Reviewed:  No new imaging today.      Assessment:  1. Trigger thumb of left hand        Plan:  Left thumb trigger digit--patient responded well to prior corticosteroid injection.  Offered and accepted repeat corticosteroid injection left thumb A1 pulley.  Injections given today.  Patient understands that we limit these injections to 2 and then discuss definitive management.  She is interested in definitive management but not until she has her breast surgery at the end of January, early February 2024.  She is hoping to do the surgeries at the same time in order to undergo 1 anesthesia.  Will see what we can do to help accommodate this, when it is time for her breast surgery.  Continue with bracing as needed.  Continue with OTC pain medication/NSAIDs as needed.  Follow-up as needed.  Questions and concerns answered.    After discussing the risks, benefits, indications of injection, the patient gave consent to proceed.  Left thumb A1 pulley was confirmed as the correct site to be injected with a timeout.  It was then prepped using Hibiclens and injected with a mixture of 1/2 cc of 1% plain lidocaine and 1/2 cc of Kenalog (40 mg per mL), without any resistance through the volar approach, patient in seated position.  Area was cleaned, hemostasis was achieved and a Band-Aid was applied over the injection site.  The patient tolerated procedure well.  I instructed the patient on signs and symptoms of infection.  They should report to the emergency department or return to clinic if any of these develop, for further evaluation and treatment.  Recommended modifying activity for the next 48 hours to include rest, ice, elevation and oral pain medication as needed.  Patient to avoid strenuous gripping or grasping for the next 2 to 3 days.       Tete Calles PA-C  12/11/23  07:47 EST      Dictated Utilizing  [employer] Dictation.

## 2023-12-11 RX ORDER — TRIAMCINOLONE ACETONIDE 40 MG/ML
20 INJECTION, SUSPENSION INTRA-ARTICULAR; INTRAMUSCULAR
Status: COMPLETED | OUTPATIENT
Start: 2023-12-07 | End: 2023-12-07

## 2023-12-11 RX ORDER — LIDOCAINE HYDROCHLORIDE 10 MG/ML
0.5 INJECTION, SOLUTION EPIDURAL; INFILTRATION; INTRACAUDAL; PERINEURAL
Status: COMPLETED | OUTPATIENT
Start: 2023-12-07 | End: 2023-12-07

## 2024-02-16 DIAGNOSIS — F51.02 ADJUSTMENT INSOMNIA: ICD-10-CM

## 2024-02-16 RX ORDER — DESVENLAFAXINE SUCCINATE 50 MG/1
50 TABLET, EXTENDED RELEASE ORAL DAILY
Qty: 30 TABLET | Refills: 5 | Status: SHIPPED | OUTPATIENT
Start: 2024-02-16

## 2024-04-09 ENCOUNTER — OFFICE VISIT (OUTPATIENT)
Dept: INTERNAL MEDICINE | Facility: CLINIC | Age: 39
End: 2024-04-09
Payer: COMMERCIAL

## 2024-04-09 VITALS
TEMPERATURE: 97.9 F | HEART RATE: 113 BPM | HEIGHT: 65 IN | DIASTOLIC BLOOD PRESSURE: 62 MMHG | WEIGHT: 162.8 LBS | OXYGEN SATURATION: 98 % | SYSTOLIC BLOOD PRESSURE: 98 MMHG | BODY MASS INDEX: 27.12 KG/M2

## 2024-04-09 DIAGNOSIS — H69.92 DYSFUNCTION OF LEFT EUSTACHIAN TUBE: ICD-10-CM

## 2024-04-09 DIAGNOSIS — H60.502 ACUTE OTITIS EXTERNA OF LEFT EAR, UNSPECIFIED TYPE: Primary | ICD-10-CM

## 2024-04-09 PROCEDURE — 99213 OFFICE O/P EST LOW 20 MIN: CPT | Performed by: NURSE PRACTITIONER

## 2024-04-09 RX ORDER — FLUTICASONE PROPIONATE 50 MCG
2 SPRAY, SUSPENSION (ML) NASAL DAILY
Qty: 16 G | Refills: 1 | Status: SHIPPED | OUTPATIENT
Start: 2024-04-09

## 2024-04-09 RX ORDER — CIPROFLOXACIN AND DEXAMETHASONE 3; 1 MG/ML; MG/ML
4 SUSPENSION/ DROPS AURICULAR (OTIC) 2 TIMES DAILY
Qty: 7.5 ML | Refills: 1 | Status: SHIPPED | OUTPATIENT
Start: 2024-04-09

## 2024-04-09 NOTE — PROGRESS NOTES
Office Note     Name: Temitope Plata    : 1985     MRN: [id number]     Chief Complaint  Earache (Left.  Had some drainage about 4 weeks ago, the past week has been painful and full.)    Subjective     History of Present Illness:  Temitope Plata is a 38 y.o. female who presents today for evaluation of acute complaints.  Patient reports left ear complaints started 1 month ago.  She reports initially after onset of symptoms she noted a small amount of drainage from her left ear.  She denies any odor to the drainage.  She does feel her left ear has been more painful the past few days.  She did undergo a mastectomy back in the fall.  She had reconstructive surgery about 1-1/2 weeks ago.  She was on oral cephalexin 1 week postop after reconstruction.  She denies any recent fever.  She denies any over-the-counter medication or eardrops.  No further complaints or concerns at this time.  Patient follows with Dr. Pearson for chronic conditions.        Past Medical History:   Diagnosis Date    BRCA2 gene mutation positive     Hemorrhoid     History of medical problems     BRCA-2 mutation    Ovarian cyst        Past Surgical History:   Procedure Laterality Date    BREAST RECONSTRUCTION, BREAST TISSUE EXPANDER INSERTION Bilateral 10/30/2023    Procedure: IMMEDIATE BREAST RECONSTRUCTION WITH BREAST TISSUE EXPANDER AND ALLODERM PLACEMENT - BILATERAL;  Surgeon: Ric Irene MD;  Location: [facility] OR;  Service: Plastics;  Laterality: Bilateral;     SECTION N/A 2020    Procedure:  SECTION PRIMARY;  Surgeon: Cristel Figueroa MD;  Location: [facility] LABOR DELIVERY;  Service: Obstetrics/Gynecology;  Laterality: N/A;    MASTECTOMY Bilateral 10/30/2023    Procedure: BREAST NIPPLE SPARING MASTECTOMY- BILATERAL;  Surgeon: Heather Irene MD;  Location: [facility] OR;  Service: General;  Laterality: Bilateral;    OVARIAN CYST REMOVAL      SALPINGECTOMY Bilateral     DURING C SECTION     "TRIGGER POINT INJECTION  7/28/23 left thumb A1    (Trigger thumb)    WISDOM TOOTH EXTRACTION         Social History     Socioeconomic History    Marital status:    Tobacco Use    Smoking status: Never    Smokeless tobacco: Never    Tobacco comments:     None   Vaping Use    Vaping status: Never Used   Substance and Sexual Activity    Alcohol use: Yes     Alcohol/week: 1.0 - 2.0 standard drink of alcohol     Types: 1 - 2 Glasses of wine per week     Comment: or less    Drug use: Never    Sexual activity: Yes     Partners: Male     Birth control/protection: Surgical     Comment: Salpingectomy, Feb 2020         Current Outpatient Medications:     desvenlafaxine (Pristiq) 50 MG 24 hr tablet, Take 1 tablet by mouth Daily. Start after finishing 25mg tablet., Disp: 30 tablet, Rfl: 5    vitamin D (ERGOCALCIFEROL) 1.25 MG (64234 UT) capsule capsule, Take 1 capsule by mouth 1 (One) Time Per Week., Disp: 12 capsule, Rfl: 1    ciprofloxacin-dexAMETHasone (Ciprodex) 0.3-0.1 % otic suspension, Administer 4 drops into the left ear 2 (Two) Times a Day., Disp: 7.5 mL, Rfl: 1    fluticasone (FLONASE) 50 MCG/ACT nasal spray, 2 sprays into the nostril(s) as directed by provider Daily., Disp: 16 g, Rfl: 1    Objective     Vital Signs  BP 98/62   Pulse 113   Temp 97.9 °F (36.6 °C)   Ht 165.1 cm (65\")   Wt 73.8 kg (162 lb 12.8 oz)   SpO2 98% Comment: ra  BMI 27.09 kg/m²   Estimated body mass index is 27.09 kg/m² as calculated from the following:    Height as of this encounter: 165.1 cm (65\").    Weight as of this encounter: 73.8 kg (162 lb 12.8 oz).           Physical Exam  Constitutional:       General: She is not in acute distress.     Appearance: Normal appearance. She is not ill-appearing.   HENT:      Head: Normocephalic and atraumatic.      Right Ear: Tympanic membrane, ear canal and external ear normal.      Left Ear: External ear normal.      Ears:      Comments: Clear effusion to left TM, mild redness and swelling of " left ear canal     Nose: Nose normal.   Eyes:      Extraocular Movements: Extraocular movements intact.      Conjunctiva/sclera: Conjunctivae normal.      Pupils: Pupils are equal, round, and reactive to light.   Cardiovascular:      Rate and Rhythm: Normal rate.   Pulmonary:      Effort: Pulmonary effort is normal. No respiratory distress.   Musculoskeletal:         General: Normal range of motion.      Cervical back: Neck supple.   Skin:     General: Skin is warm and dry.   Neurological:      General: No focal deficit present.      Mental Status: She is alert and oriented to person, place, and time. Mental status is at baseline.   Psychiatric:         Mood and Affect: Mood normal.         Behavior: Behavior normal.         Thought Content: Thought content normal.         Judgment: Judgment normal.          Assessment and Plan     Diagnoses and all orders for this visit:    1. Acute otitis externa of left ear, unspecified type (Primary)  -     ciprofloxacin-dexAMETHasone (Ciprodex) 0.3-0.1 % otic suspension; Administer 4 drops into the left ear 2 (Two) Times a Day.  Dispense: 7.5 mL; Refill: 1    2. Dysfunction of left eustachian tube  -     fluticasone (FLONASE) 50 MCG/ACT nasal spray; 2 sprays into the nostril(s) as directed by provider Daily.  Dispense: 16 g; Refill: 1        Follow Up  Return if symptoms worsen or fail to improve, for Follow up with Dr. Pearson.    ANNIE Ray    Part of this note may be an electronic transcription/translation of spoken language to printed text using the Dragon Dictation System.

## 2024-05-31 ENCOUNTER — OFFICE VISIT (OUTPATIENT)
Dept: INTERNAL MEDICINE | Facility: CLINIC | Age: 39
End: 2024-05-31
Payer: COMMERCIAL

## 2024-05-31 VITALS
OXYGEN SATURATION: 99 % | TEMPERATURE: 98.2 F | DIASTOLIC BLOOD PRESSURE: 72 MMHG | HEART RATE: 88 BPM | HEIGHT: 66 IN | BODY MASS INDEX: 27.06 KG/M2 | SYSTOLIC BLOOD PRESSURE: 114 MMHG | WEIGHT: 168.4 LBS

## 2024-05-31 DIAGNOSIS — E55.9 VITAMIN D DEFICIENCY: ICD-10-CM

## 2024-05-31 DIAGNOSIS — Z00.00 ROUTINE GENERAL MEDICAL EXAMINATION AT A HEALTH CARE FACILITY: Primary | ICD-10-CM

## 2024-05-31 DIAGNOSIS — F51.02 ADJUSTMENT INSOMNIA: ICD-10-CM

## 2024-05-31 DIAGNOSIS — L65.9 ALOPECIA: ICD-10-CM

## 2024-05-31 LAB
BILIRUB BLD-MCNC: NEGATIVE MG/DL
CLARITY, POC: CLEAR
COLOR UR: YELLOW
EXPIRATION DATE: NORMAL
GLUCOSE UR STRIP-MCNC: NEGATIVE MG/DL
KETONES UR QL: NEGATIVE
LEUKOCYTE EST, POC: NEGATIVE
Lab: NORMAL
NITRITE UR-MCNC: NEGATIVE MG/ML
PH UR: 6.5 [PH] (ref 5–8)
PROT UR STRIP-MCNC: NEGATIVE MG/DL
RBC # UR STRIP: NEGATIVE /UL
SP GR UR: 1.01 (ref 1–1.03)
UROBILINOGEN UR QL: NORMAL

## 2024-05-31 PROCEDURE — 99395 PREV VISIT EST AGE 18-39: CPT | Performed by: INTERNAL MEDICINE

## 2024-05-31 PROCEDURE — 81003 URINALYSIS AUTO W/O SCOPE: CPT | Performed by: INTERNAL MEDICINE

## 2024-05-31 RX ORDER — SPIRONOLACTONE 50 MG/1
TABLET, FILM COATED ORAL
Qty: 30 TABLET | Refills: 5 | Status: SHIPPED | OUTPATIENT
Start: 2024-05-31

## 2024-05-31 RX ORDER — DESVENLAFAXINE SUCCINATE 50 MG/1
50 TABLET, EXTENDED RELEASE ORAL DAILY
Qty: 90 TABLET | Refills: 3 | Status: SHIPPED | OUTPATIENT
Start: 2024-05-31

## 2024-05-31 NOTE — PROGRESS NOTES
Chief Complaint   Patient presents with    Annual Exam       History of Present Illness  , Adult Female: . The patient is being seen for a health maintenance and gynecology evaluation. The last health maintenance visit was 1 year(s) ago. Gyn- Dr. Cristel Figueroa.  Social History: She is  with 2 daughters 8 and 4( Carmen and Guillermina). 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 Played volleyball and softball during college.  She has never smoked. She reports occ drinking alcohol. Denies any illicit drug use.  General Health: The patient's health is described as good. She has regular dental visits. She denies vision problems. She denies hearing loss. Immunizations status: up to date.   Lifestyle:. She consumes a diverse and healthy diet. She does not have any weight concerns. She exercises regularly. She denies tobacco. She denies alcohol use. She denies drug use.   Reproductive health:. She reports normal menses.   Screening: Cancer screening reviewed and current.   Dad  of Brain ca with lung and renal mets( ? Lung Ca with brain mets)  Mom had lymphoma [facility] in 2019  Metabolic screening reviewed and current.   Risk screening reviewed and current.       HPI  Has been having increased hair hair loss., Plan for JAVIER and BSO when she turns 40.      Review of Systems   Constitutional:  Negative for chills and fatigue.   HENT:  Negative for congestion, ear pain and sore throat.    Eyes:  Negative for pain, redness and visual disturbance.   Respiratory:  Negative for cough and shortness of breath.    Cardiovascular:  Negative for chest pain, palpitations and leg swelling.   Gastrointestinal:  Negative for abdominal pain, diarrhea and nausea.   Endocrine: Negative for cold intolerance and heat intolerance.   Genitourinary:  Negative for flank pain and urgency.   Musculoskeletal:  Negative for arthralgias and gait problem.   Skin:  Negative for pallor and rash.   Neurological:  Positive for numbness  "(at surgical site.). Negative for dizziness, weakness and headaches.   Psychiatric/Behavioral:  Negative for dysphoric mood and sleep disturbance. The patient is not nervous/anxious.        Patient Active Problem List   Diagnosis    S/P  section    Family history of Hashimoto thyroiditis    Family history of cancer in father    Family history of lymphoma    Family history of cancer in grandmother    Acquired absence of breast and absent nipple       Social History     Socioeconomic History    Marital status:    Tobacco Use    Smoking status: Never    Smokeless tobacco: Never    Tobacco comments:     None   Vaping Use    Vaping status: Never Used   Substance and Sexual Activity    Alcohol use: Yes     Alcohol/week: 1.0 - 2.0 standard drink of alcohol     Types: 1 - 2 Glasses of wine per week     Comment: or less    Drug use: Never    Sexual activity: Yes     Partners: Male     Birth control/protection: Surgical     Comment: Salpingectomy, 2020       Current Outpatient Medications on File Prior to Visit   Medication Sig Dispense Refill    desvenlafaxine (Pristiq) 50 MG 24 hr tablet Take 1 tablet by mouth Daily. Start after finishing 25mg tablet. 30 tablet 5    [DISCONTINUED] ciprofloxacin-dexAMETHasone (Ciprodex) 0.3-0.1 % otic suspension Administer 4 drops into the left ear 2 (Two) Times a Day. 7.5 mL 1    [DISCONTINUED] fluticasone (FLONASE) 50 MCG/ACT nasal spray 2 sprays into the nostril(s) as directed by provider Daily. 16 g 1    [DISCONTINUED] vitamin D (ERGOCALCIFEROL) 1.25 MG (22873 UT) capsule capsule Take 1 capsule by mouth 1 (One) Time Per Week. 12 capsule 1     No current facility-administered medications on file prior to visit.       Allergies   Allergen Reactions    Oxycodone Nausea Only and Dizziness     Zofran did not help       /72 (BP Location: Left arm, Patient Position: Sitting, Cuff Size: Adult)   Pulse 88   Temp 98.2 °F (36.8 °C) (Temporal)   Ht 166.5 cm (65.55\")   Wt " 76.4 kg (168 lb 6.4 oz)   SpO2 99%   BMI 27.55 kg/m²            The following portions of the patient's history were reviewed and updated as appropriate: allergies, current medications, past family history, past medical history, past social history, past surgical history, and problem list.    Physical Exam  Constitutional:       General: She is not in acute distress.     Appearance: Normal appearance.   HENT:      Head: Normocephalic and atraumatic.      Right Ear: Tympanic membrane and external ear normal.      Left Ear: Tympanic membrane and external ear normal.      Nose: Nose normal.      Mouth/Throat:      Mouth: Mucous membranes are moist.   Eyes:      General: No scleral icterus.  Neck:      Vascular: No carotid bruit.   Cardiovascular:      Rate and Rhythm: Normal rate and regular rhythm.      Pulses: Normal pulses.      Heart sounds: Normal heart sounds. No murmur heard.     No friction rub. No gallop.   Pulmonary:      Effort: Pulmonary effort is normal.      Breath sounds: Normal breath sounds. No rhonchi or rales.   Abdominal:      General: Bowel sounds are normal. There is no distension.      Palpations: Abdomen is soft.      Tenderness: There is no right CVA tenderness, left CVA tenderness, guarding or rebound.      Hernia: No hernia is present.   Musculoskeletal:         General: No tenderness. Normal range of motion.      Cervical back: Normal range of motion.      Right lower leg: No edema.      Left lower leg: No edema.   Lymphadenopathy:      Cervical: No cervical adenopathy.   Skin:     General: Skin is warm.      Findings: No rash.      Comments: Well healed mastectomy wound   Neurological:      General: No focal deficit present.      Mental Status: She is alert and oriented to person, place, and time. Mental status is at baseline.      Cranial Nerves: No cranial nerve deficit.      Sensory: No sensory deficit.      Coordination: Coordination normal.      Gait: Gait normal.      Deep Tendon  Reflexes: Reflexes normal.   Psychiatric:         Mood and Affect: Mood normal.         Behavior: Behavior normal.                 Results for orders placed or performed in visit on 05/31/24   POCT urinalysis dipstick, automated    Specimen: Urine   Result Value Ref Range    Color Yellow Yellow, Straw, Dark Yellow, Jesika    Clarity, UA Clear Clear    Specific Gravity  1.010 1.005 - 1.030    pH, Urine 6.5 5.0 - 8.0    Leukocytes Negative Negative    Nitrite, UA Negative Negative    Protein, POC Negative Negative mg/dL    Glucose, UA Negative Negative mg/dL    Ketones, UA Negative Negative    Urobilinogen, UA 0.2 E.U./dL Normal, 0.2 E.U./dL    Bilirubin Negative Negative    Blood, UA Negative Negative    Lot Number [id number]     Expiration Date 11/08/2025        Diagnoses and all orders for this visit:    1. Routine general medical examination at a health care facility (Primary)  -     POCT urinalysis dipstick, automated    2. Adjustment insomnia    3. Alopecia          Health Maintenance   Topic Date Due    COVID-19 Vaccine (5 - 2023-24 season) 09/01/2023    ANNUAL PHYSICAL  05/24/2024    BMI FOLLOWUP  06/05/2024    INFLUENZA VACCINE  08/01/2024    PAP SMEAR  05/24/2026    TDAP/TD VACCINES (3 - Td or Tdap) 01/29/2030    HEPATITIS C SCREENING  Completed    Pneumococcal Vaccine 0-64  Aged Out       Discussion/Summary  Impression: health maintenance visit. Currently, she eats an adequate diet and has an adequate exercise regimen.   Cervical cancer screening:Pap smear is current.   Breast cancer screening: mammogram is not indicated- s/p b/l mastectomy.   Colorectal cancer screening: colonoscopy is due at 45.  Osteoporosis screening: Bone mineral density test is not indicated. Adv to d/w Dr. Figueroa if planning on TLH and BSO.  CT low dose screen - not indicated.  Screening lab work includes hemoglobin, glucose, lipid profile, thyroid function testing, 25-hydroxyvitamin D and urinalysis.   Immunizations are needed,  immunizations will be given as outlined in the orders   Advice and education were given regarding cardiovascular risk reduction, healthy dietary habits, Seatbelt and helmet use and self skin examination.     Return in about 1 year (around 5/31/2025) for Annual.      Electronically signed by:    Viridiana Pearson MD

## 2024-09-26 ENCOUNTER — OFFICE VISIT (OUTPATIENT)
Dept: ORTHOPEDIC SURGERY | Facility: CLINIC | Age: 39
End: 2024-09-26
Payer: COMMERCIAL

## 2024-09-26 VITALS
HEIGHT: 66 IN | DIASTOLIC BLOOD PRESSURE: 72 MMHG | WEIGHT: 172.6 LBS | SYSTOLIC BLOOD PRESSURE: 118 MMHG | BODY MASS INDEX: 27.74 KG/M2

## 2024-09-26 DIAGNOSIS — M65.312 TRIGGER FINGER OF LEFT THUMB: Primary | ICD-10-CM

## 2024-10-20 ENCOUNTER — PATIENT MESSAGE (OUTPATIENT)
Dept: ORTHOPEDIC SURGERY | Facility: CLINIC | Age: 39
End: 2024-10-20
Payer: COMMERCIAL

## 2024-10-21 NOTE — TELEPHONE ENCOUNTER
Restrictions include no push, pull or lifting greater than 5 pounds for 2 weeks. Then there will be no restrictions.

## 2024-10-25 ENCOUNTER — OUTSIDE FACILITY SERVICE (OUTPATIENT)
Dept: ORTHOPEDIC SURGERY | Facility: CLINIC | Age: 39
End: 2024-10-25
Payer: COMMERCIAL

## 2024-10-25 ENCOUNTER — DOCUMENTATION (OUTPATIENT)
Dept: ORTHOPEDIC SURGERY | Facility: CLINIC | Age: 39
End: 2024-10-25

## 2024-10-25 PROCEDURE — 26055 INCISE FINGER TENDON SHEATH: CPT | Performed by: STUDENT IN AN ORGANIZED HEALTH CARE EDUCATION/TRAINING PROGRAM

## 2024-10-25 NOTE — PROGRESS NOTES
ORTHOPEDIC OPERATIVE REPORT    PATIENT NAME: Temitope Plata    MRN: [id number]    LOCATION: [facility]    DATE OF SURGERY: 10/25/24    PREOPERATIVE DIAGNOSIS:   Left trigger thumb     POSTOPERATIVE DIAGNOSIS:  Left trigger thumb      PROCEDURE PERFORMED:  Left thumb A1 pulley release    CPT CODE:  85252    SURGEON: Kayla Arce MD     ASSISTANT: None     ANESTHESIA: Local anesthesia with 1% lidocaine and 0.5% Marcaine plain 50-50 mixture    SPECIMENS: None    TOURNIQUET TIME: 3 minutes    ESTIMATED BLOOD LOSS: Minimal    COMPLICATIONS: None    IMPLANTS: None    INDICATIONS FOR PROCEDURE:  Temitope Plata is a 39-year-old right-hand-dominant female who presented to clinic with complaints of thumb catching and locking.  Clinical examination was consistent with a trigger finger.  She has had 2 prior trigger thumb injections that provided temporary relief.  The patient was offered conservative treatment including stretching exercises, nighttime Coban wrapping, and corticosteroid injection. The patient failed to improve with these measures and was offered a trigger finger release. After discussion of the risks, benefits, alternatives and prognosis, they elected to proceed with left thumb A1 pulley release.    DESCRIPTION OF PROCEDURE:   The patient was identified in the preoperative holding area utilizing two patient identifiers. Local anesthetic consisting of a 50:50 mixture of 0.5% Marcaine mixed with 1% lidocaine plain was injected in the preoperative holding area.  They were taken back to the operating room and placed supine on the operative table.  A forearm tourniquet was placed and the operative extremity was prepped and draped in a standard sterile fashion. A surgical time out was performed that confirmed the correct site, procedure and laterality. Surgical antibiotics were not indicated.    An incision was drawn out obliquely overlying the thumb A1 pulley approximately 1 cm in length. An esmarch was  used to exsanguinate the limb, and the tourniquet was inflated to 250 mmHg. A 15 blade was used to incise through the skin and subcutaneous tissues. Littler scissors were used to bluntly dissect distally and proximally.  Three ragnells were placed at the wound edges retracting the underlying subcutaneous tissues and exposing the A1 pulley.  Using a fresh 15 blade, the A1 pulley was longitudinally incised.  Littler scissors were used distally to complete release of the A1 pulley with care taken not to violate the oblique pulley.  The ragnells were repositioned to expose the A1 pulley proximally and release was completed with Littler scissors.    The underlying FPL tendon was visually inspected demonstrating evidence of fraying and inflammation.  With the patient awake, they were asked to bring the digit through a range of motion several times.  There was no catching or locking palpated or visualized during range of motion.  The tourniquet was let down and hemostasis was achieved at the wound edges using a bipolar cautery.  The wound was thoroughly irrigated with normal saline and closed with a 4-0 nylon.  4 x 4 gauze and Coban were placed.  The patient was transferred to the PACU in good and stable condition.  All counts were correct at the end of the case.    POSTOPERATIVE PLAN:  No lifting greater than 5 pounds with the operative extremity.  Range of motion of the digits is encouraged.  2.  Over the counter Tylenol and/or Advil/Aleve/Motrin for pain control.   3.  Dressings may be removed in 5 days and replaced with a dry daily dressing.  4.  Follow up in 10-14 days as scheduled.    Kayla Arce MD  [facility] [place] [facility] Surgeon

## 2024-11-01 ENCOUNTER — TELEPHONE (OUTPATIENT)
Dept: ORTHOPEDIC SURGERY | Facility: CLINIC | Age: 39
End: 2024-11-01
Payer: COMMERCIAL

## 2024-11-01 NOTE — TELEPHONE ENCOUNTER
Admin Notation: Called patient to assist in filling out short term disability forms received from [facility], completing the BH form cover sheet and collecting $25.00 form fee. Reached voicemail, left brief message. This is of informative nature only.

## 2024-11-05 NOTE — PROGRESS NOTES
[facility] Orthopedic     Post Operative Office Visit      Date: 11/07/2024   Patient Name: Temitope Plata  MRN: [id number]  YOB: 1985    Chief Complaint:   Chief Complaint   Patient presents with    Post-op     2 weeks status post Left thumb A1 pulley release 10/25/2024     History of Present Illness:   Temitope Plata is a 39 y.o. female status post left trigger thumb release, DOS 10/25/2024.  The patient reports doing well postoperatively.  She denies any pain.  No catching or locking.  No wound complications.  No other complaints or concerns.    Subjective   Review of Systems:   Review of Systems     I reviewed the patient's chief complaint, history of present illness, review of systems, past medical history, surgical history, family history, social history, medications and allergy list in the EMR on 11/07/2024 and agree with the findings above.    Objective    Vital Signs:   Vitals:    11/07/24 0813   Temp: 97.1 °F (36.2 °C)       General Appearance: No acute distress. Alert and oriented.     Ortho Exam:  Examination of the left Upper Extremity:  Skin examination: Healing surgical incision overlying the thumb A1 pulley.  Sutures are in place without erythema warmth drainage or dehiscence.  These removed today in clinic and tolerated well.  Nontender to palpation over surgical incision  Able to make a composite fist  AIN/PIN/Radial/Ulnar nerves grossly motor intact   Sensation is intact light touch along the radial and ulnar borders of the thumb.  Palpable radial pulse, digits are warm and well-perfused    Imaging / Studies:    Imaging Results (Last 24 Hours)       ** No results found for the last 24 hours. **          Assessment / Plan    Assessment/Plan:   Temitope Plata is a 39 y.o. female status post left trigger thumb release, DOS 10/25/2024.    Patient has done well postoperatively.  Sutures removed today  in clinic and I counseled her on gentle scar massage.  She may return to all of her activities without restriction.  I will see her back in 4 weeks for reevaluation.  All questions and concerns were addressed.  She was agreeable.      ICD-10-CM ICD-9-CM   1. Trigger finger of left thumb  M65.312 727.03     Follow Up:   Return in about 4 weeks (around 12/5/2024) for Follow Up.      Kayla Arce MD  [facility] [place] [facility] Hand Surgeon

## 2024-11-07 ENCOUNTER — OFFICE VISIT (OUTPATIENT)
Dept: ORTHOPEDIC SURGERY | Facility: CLINIC | Age: 39
End: 2024-11-07
Payer: COMMERCIAL

## 2024-11-07 VITALS — TEMPERATURE: 97.1 F

## 2024-11-07 DIAGNOSIS — M65.312 TRIGGER FINGER OF LEFT THUMB: Primary | ICD-10-CM

## 2024-11-07 PROCEDURE — 99024 POSTOP FOLLOW-UP VISIT: CPT | Performed by: STUDENT IN AN ORGANIZED HEALTH CARE EDUCATION/TRAINING PROGRAM

## 2025-02-03 ENCOUNTER — PATIENT MESSAGE (OUTPATIENT)
Dept: INTERNAL MEDICINE | Facility: CLINIC | Age: 40
End: 2025-02-03
Payer: COMMERCIAL

## 2025-02-03 DIAGNOSIS — J11.1 INFLUENZA: Primary | ICD-10-CM

## 2025-02-04 ENCOUNTER — TELEPHONE (OUTPATIENT)
Dept: INTERNAL MEDICINE | Facility: CLINIC | Age: 40
End: 2025-02-04
Payer: COMMERCIAL

## 2025-02-04 NOTE — TELEPHONE ENCOUNTER
Patient was returning a call to Dr Pearson to discuss treatment options. She scheduled a same day appointment with JENNIFER Nassar for today. Please call back if she can cancel her appointment.

## 2025-02-05 RX ORDER — OSELTAMIVIR PHOSPHATE 75 MG/1
75 CAPSULE ORAL 2 TIMES DAILY
Qty: 10 CAPSULE | Refills: 0 | Status: SHIPPED | OUTPATIENT
Start: 2025-02-05

## 2025-02-21 ENCOUNTER — OFFICE VISIT (OUTPATIENT)
Dept: INTERNAL MEDICINE | Facility: CLINIC | Age: 40
End: 2025-02-21
Payer: COMMERCIAL

## 2025-02-21 VITALS
DIASTOLIC BLOOD PRESSURE: 80 MMHG | HEART RATE: 99 BPM | HEIGHT: 66 IN | WEIGHT: 174.2 LBS | SYSTOLIC BLOOD PRESSURE: 118 MMHG | TEMPERATURE: 97.7 F | BODY MASS INDEX: 28 KG/M2 | OXYGEN SATURATION: 99 %

## 2025-02-21 DIAGNOSIS — J01.90 ACUTE NON-RECURRENT SINUSITIS, UNSPECIFIED LOCATION: Primary | ICD-10-CM

## 2025-02-21 LAB
EXPIRATION DATE: NORMAL
FLUAV AG UPPER RESP QL IA.RAPID: NOT DETECTED
FLUBV AG UPPER RESP QL IA.RAPID: NOT DETECTED
INTERNAL CONTROL: NORMAL
Lab: NORMAL
SARS-COV-2 AG UPPER RESP QL IA.RAPID: NOT DETECTED

## 2025-02-21 PROCEDURE — 99214 OFFICE O/P EST MOD 30 MIN: CPT | Performed by: INTERNAL MEDICINE

## 2025-02-21 PROCEDURE — 87428 SARSCOV & INF VIR A&B AG IA: CPT | Performed by: INTERNAL MEDICINE

## 2025-02-21 RX ORDER — BENZONATATE 100 MG/1
100 CAPSULE ORAL 3 TIMES DAILY PRN
Qty: 30 CAPSULE | Refills: 0 | Status: SHIPPED | OUTPATIENT
Start: 2025-02-21

## 2025-02-21 RX ORDER — FLUTICASONE PROPIONATE 50 MCG
1-2 SPRAY, SUSPENSION (ML) NASAL DAILY
Qty: 16 G | Refills: 0 | Status: SHIPPED | OUTPATIENT
Start: 2025-02-21

## 2025-02-21 NOTE — PROGRESS NOTES
Internal Medicine Acute Visit    Chief Complaint   Patient presents with    Cough    Nasal Congestion    Headache        HPI  Ms. Plaat comes in today for a sick visit. She presumably had the flu about 2 weeks ago with fever x3d, cough, congestion, earache. Both of her children were diagnosed with flu. She was not formally tested. She did try to get tamiflu but due to shortage could not find it. She notes fever resolved as well as earache. She has continued to have cough which seems to be progressively worsening along with sensation of wheezing. She has recently developed headache with cough. She has not had recurrence of fever. She has been taking tesselon perles and mucinex DM. She is concerned with possible sinus infection.    Cough  Associated symptoms include headaches, postnasal drip, a sore throat (mild) and wheezing. Pertinent negatives include no ear pain, fever or shortness of breath.   Headache       Review of Systems  Review of Systems   Constitutional:  Negative for fever.   HENT:  Positive for congestion, postnasal drip, sinus pressure and sore throat (mild). Negative for ear pain.    Respiratory:  Positive for cough and wheezing. Negative for shortness of breath.    Neurological:  Positive for headache.        Medications  Current Outpatient Medications on File Prior to Visit   Medication Sig Dispense Refill    desvenlafaxine (Pristiq) 50 MG 24 hr tablet Take 1 tablet by mouth Daily. Start after finishing 25mg tablet. 90 tablet 3    [DISCONTINUED] spironolactone (Aldactone) 50 MG tablet 1/2 PO daily x 1 week then 1 PO daily. 30 tablet 5    [DISCONTINUED] oseltamivir (Tamiflu) 75 MG capsule Take 1 capsule by mouth 2 (Two) Times a Day. 10 capsule 0     No current facility-administered medications on file prior to visit.        Allergies  Allergies   Allergen Reactions    Oxycodone Nausea Only and Dizziness     Zofran did not help       PMH  Past Medical History:   Diagnosis Date    BRCA2 gene mutation  "positive     Hemorrhoid     History of medical problems     BRCA-2 mutation    Ovarian cyst        Objective  Visit Vitals  /80 (BP Location: Left arm, Patient Position: Sitting)   Pulse 99   Temp 97.7 °F (36.5 °C) (Temporal)   Ht 168 cm (66.14\")   Wt 79 kg (174 lb 3.2 oz)   SpO2 99%   BMI 28.00 kg/m²        Physical Exam  Physical Exam  Vitals and nursing note reviewed.   Constitutional:       General: She is not in acute distress.     Appearance: She is well-developed. She is not ill-appearing or toxic-appearing.   HENT:      Head: Normocephalic and atraumatic.      Right Ear: Tympanic membrane, ear canal and external ear normal.      Left Ear: Tympanic membrane, ear canal and external ear normal.      Mouth/Throat:      Mouth: Mucous membranes are moist.      Pharynx: Oropharynx is clear. No oropharyngeal exudate or posterior oropharyngeal erythema.   Eyes:      Conjunctiva/sclera: Conjunctivae normal.   Cardiovascular:      Rate and Rhythm: Normal rate and regular rhythm.      Heart sounds: Normal heart sounds.   Pulmonary:      Effort: Pulmonary effort is normal. No respiratory distress.      Breath sounds: Normal breath sounds. No wheezing, rhonchi or rales.   Lymphadenopathy:      Cervical: No cervical adenopathy.   Skin:     General: Skin is warm and dry.   Neurological:      General: No focal deficit present.      Mental Status: She is alert and oriented to person, place, and time.         Results  Results for orders placed or performed in visit on 02/21/25   POCT SARS-CoV-2 Antigen JOSH + Flu    Collection Time: 02/21/25  9:51 AM    Specimen: Swab   Result Value Ref Range    SARS Antigen Not Detected Not Detected, Presumptive Negative    Influenza A Antigen JOSH Not Detected Not Detected    Influenza B Antigen JOSH Not Detected Not Detected    Internal Control Passed Passed    Lot Number [id number]     Expiration Date 11-14-25         Assessment and Plan  Diagnoses and all orders for this visit:    Acute " non-recurrent sinusitis, unspecified location  - POC testing today negative, presume she had flu about 2 weeks ago  - lungs clear today, no fever so do not suspect PNA  - sxs may be related to sinusitis and post nasal drip  - will start augmentin BID x7d, refill tesselon perles PRN, and start flonase nightly  - RTC if not improving or fever recurs        Return if symptoms worsen or fail to improve.

## 2025-06-29 DIAGNOSIS — F51.02 ADJUSTMENT INSOMNIA: ICD-10-CM

## 2025-06-29 DIAGNOSIS — Z00.00 ROUTINE GENERAL MEDICAL EXAMINATION AT A HEALTH CARE FACILITY: ICD-10-CM

## 2025-06-30 RX ORDER — DESVENLAFAXINE 50 MG/1
50 TABLET, FILM COATED, EXTENDED RELEASE ORAL DAILY
Qty: 30 TABLET | Refills: 0 | Status: SHIPPED | OUTPATIENT
Start: 2025-06-30

## 2025-07-15 ENCOUNTER — OFFICE VISIT (OUTPATIENT)
Dept: INTERNAL MEDICINE | Facility: CLINIC | Age: 40
End: 2025-07-15
Payer: COMMERCIAL

## 2025-07-15 VITALS
BODY MASS INDEX: 27.42 KG/M2 | HEART RATE: 79 BPM | SYSTOLIC BLOOD PRESSURE: 110 MMHG | OXYGEN SATURATION: 99 % | TEMPERATURE: 97.6 F | HEIGHT: 66 IN | WEIGHT: 170.6 LBS | DIASTOLIC BLOOD PRESSURE: 74 MMHG

## 2025-07-15 DIAGNOSIS — R61 NIGHT SWEATS: ICD-10-CM

## 2025-07-15 DIAGNOSIS — R41.840 ATTENTION OR CONCENTRATION DEFICIT: ICD-10-CM

## 2025-07-15 DIAGNOSIS — Z00.00 ROUTINE GENERAL MEDICAL EXAMINATION AT A HEALTH CARE FACILITY: Primary | ICD-10-CM

## 2025-07-15 DIAGNOSIS — F51.02 ADJUSTMENT INSOMNIA: ICD-10-CM

## 2025-07-15 DIAGNOSIS — E55.9 VITAMIN D DEFICIENCY: ICD-10-CM

## 2025-07-15 DIAGNOSIS — Z15.01 BRCA2 POSITIVE: ICD-10-CM

## 2025-07-15 DIAGNOSIS — Z15.09 BRCA2 POSITIVE: ICD-10-CM

## 2025-07-15 LAB
BILIRUB BLD-MCNC: NEGATIVE MG/DL
CLARITY, POC: CLEAR
COLOR UR: YELLOW
EXPIRATION DATE: NORMAL
GLUCOSE UR STRIP-MCNC: NEGATIVE MG/DL
KETONES UR QL: NEGATIVE
LEUKOCYTE EST, POC: NEGATIVE
Lab: NORMAL
NITRITE UR-MCNC: NEGATIVE MG/ML
PH UR: 6 [PH] (ref 5–8)
PROT UR STRIP-MCNC: NEGATIVE MG/DL
RBC # UR STRIP: NEGATIVE /UL
SP GR UR: 1.02 (ref 1–1.03)
UROBILINOGEN UR QL: NORMAL

## 2025-07-15 PROCEDURE — 81003 URINALYSIS AUTO W/O SCOPE: CPT | Performed by: INTERNAL MEDICINE

## 2025-07-15 PROCEDURE — 99396 PREV VISIT EST AGE 40-64: CPT | Performed by: INTERNAL MEDICINE

## 2025-07-15 RX ORDER — DESVENLAFAXINE 50 MG/1
50 TABLET, FILM COATED, EXTENDED RELEASE ORAL DAILY
Qty: 90 TABLET | Refills: 3 | Status: SHIPPED | OUTPATIENT
Start: 2025-07-15

## 2025-07-15 NOTE — PROGRESS NOTES
Chief Complaint   Patient presents with    Annual Exam       History of Present Illness  HM, Adult Female:  The patient is being seen for a health maintenance and gynecology evaluation. The last health maintenance visit was 1 year(s) ago. Gyn- Dr. Cristel Figueroa.  Social History: She is  with 2 daughters 9 and 4( Carmen and Guillermina). 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 Played volleyball and softball during college.  She has never smoked. She reports occ drinking alcohol. Denies any illicit drug use.  General Health: The patient's health is described as good. She has regular dental visits. She denies vision problems- wears contacts. She denies hearing loss. Immunizations status: up to date.   Lifestyle:. She consumes a diverse and healthy diet. She does not have any weight concerns. She exercises regularly. She denies tobacco. She denies alcohol use. She denies drug use.   Reproductive health:. She reports normal menses.   Screening: Cancer screening reviewed and current.   BRCA +ve, and is s/p B/l mastectomy, fallopian tube removal.  Dad  of Brain ca with lung and renal mets( ? Lung Ca with brain mets)  Mom had lymphoma [facility] in 2019  Metabolic screening reviewed and current.   Risk screening reviewed and current.       HPI  Doing well overall, some scar pain, getting better.  Concerned about ADD, as she feels she has several unfinished tasks.  Periods are a little heavy,   Sleep issues.      Review of Systems   Constitutional: Negative.  Negative for chills and fever.   HENT:  Negative for ear discharge, ear pain, sinus pressure and sore throat.    Respiratory:  Negative for cough, chest tightness and shortness of breath.    Cardiovascular:  Negative for chest pain, palpitations and leg swelling.   Gastrointestinal:  Negative for diarrhea, nausea and vomiting.   Musculoskeletal:  Positive for myalgias. Negative for arthralgias and back pain.   Neurological:  Negative for dizziness,  "syncope and headaches.   Psychiatric/Behavioral:  Positive for sleep disturbance. Negative for confusion.        Patient Active Problem List   Diagnosis    S/P  section    Family history of Hashimoto thyroiditis    Family history of cancer in father    Family history of lymphoma    Family history of cancer in grandmother    Acquired absence of breast and absent nipple    Trigger finger of left thumb    BRCA2 positive       Social History     Socioeconomic History    Marital status:    Tobacco Use    Smoking status: Never    Smokeless tobacco: Never    Tobacco comments:     None   Vaping Use    Vaping status: Never Used   Substance and Sexual Activity    Alcohol use: Yes     Alcohol/week: 1.0 - 2.0 standard drink of alcohol     Types: 1 - 2 Glasses of wine per week     Comment: or less    Drug use: Never    Sexual activity: Yes     Partners: Male     Birth control/protection: Surgical     Comment: Salpingectomy, 2020       Current Outpatient Medications on File Prior to Visit   Medication Sig Dispense Refill    [DISCONTINUED] desvenlafaxine (PRISTIQ) 50 MG 24 hr tablet TAKE 1 TABLET BY MOUTH DAILY. START AFTER FINISHING 25MG TABLET. 30 tablet 0    [DISCONTINUED] benzonatate (Tessalon Perles) 100 MG capsule Take 1 capsule by mouth 3 (Three) Times a Day As Needed for Cough. 30 capsule 0    [DISCONTINUED] fluticasone (FLONASE) 50 MCG/ACT nasal spray Administer 1-2 sprays into the nostril(s) as directed by provider Daily. 16 g 0     No current facility-administered medications on file prior to visit.       Allergies   Allergen Reactions    Oxycodone Nausea Only and Dizziness     Zofran did not help       /74   Pulse 79   Temp 97.6 °F (36.4 °C)   Ht 168 cm (66.14\")   Wt 77.4 kg (170 lb 9.6 oz)   LMP 2025 (Exact Date)   SpO2 99% Comment: ra  BMI 27.42 kg/m²            The following portions of the patient's history were reviewed and updated as appropriate: allergies, current " medications, past family history, past medical history, past social history, past surgical history, and problem list.    Physical Exam  Constitutional:       General: She is not in acute distress.     Appearance: Normal appearance.   HENT:      Head: Normocephalic and atraumatic.      Right Ear: Tympanic membrane and external ear normal.      Left Ear: Tympanic membrane and external ear normal.      Nose: Nose normal.      Mouth/Throat:      Mouth: Mucous membranes are moist.   Eyes:      General: No scleral icterus.  Neck:      Vascular: No carotid bruit.   Cardiovascular:      Rate and Rhythm: Normal rate and regular rhythm.      Pulses: Normal pulses.      Heart sounds: Normal heart sounds. No murmur heard.     No friction rub. No gallop.   Pulmonary:      Effort: Pulmonary effort is normal.      Breath sounds: Normal breath sounds. No rhonchi or rales.   Abdominal:      General: Bowel sounds are normal. There is no distension.      Palpations: Abdomen is soft.      Tenderness: There is no right CVA tenderness, left CVA tenderness, guarding or rebound.      Hernia: No hernia is present.   Musculoskeletal:         General: No tenderness. Normal range of motion.      Cervical back: Normal range of motion.      Right lower leg: No edema.      Left lower leg: No edema.   Lymphadenopathy:      Cervical: No cervical adenopathy.   Skin:     General: Skin is warm.      Findings: No rash.   Neurological:      General: No focal deficit present.      Mental Status: She is alert and oriented to person, place, and time. Mental status is at baseline.      Cranial Nerves: No cranial nerve deficit.      Sensory: No sensory deficit.      Coordination: Coordination normal.      Gait: Gait normal.      Deep Tendon Reflexes: Reflexes normal.      Comments: Balance wnl   Psychiatric:         Mood and Affect: Mood normal.         Behavior: Behavior normal.         BMI is >= 25 and <30. (Overweight) The following options were offered  after discussion;: exercise counseling/recommendations and nutrition counseling/recommendations       Results for orders placed or performed in visit on 02/21/25   POCT SARS-CoV-2 Antigen JOSH + Flu    Collection Time: 02/21/25  9:51 AM    Specimen: Swab   Result Value Ref Range    SARS Antigen Not Detected Not Detected, Presumptive Negative    Influenza A Antigen JOSH Not Detected Not Detected    Influenza B Antigen JOSH Not Detected Not Detected    Internal Control Passed Passed    Lot Number [id number]     Expiration Date 11-14-25        Diagnoses and all orders for this visit:    1. Routine general medical examination at a health care facility (Primary)  -     POCT urinalysis dipstick, automated  -     desvenlafaxine (PRISTIQ) 50 MG 24 hr tablet; Take 1 tablet by mouth Daily.  Dispense: 90 tablet; Refill: 3  -     CBC (No Diff); Future  -     Comprehensive Metabolic Panel; Future  -     Cancel: Hemoglobin A1c; Future  -     Lipid Panel; Future  -     TSH Rfx On Abnormal To Free T4; Future  -     Vitamin B12; Future    2. Adjustment insomnia  Comments:  Add mag- CALM  Orders:  -     desvenlafaxine (PRISTIQ) 50 MG 24 hr tablet; Take 1 tablet by mouth Daily.  Dispense: 90 tablet; Refill: 3    3. Night sweats  -     Follicle Stimulating Hormone; Future    4. Vitamin D deficiency  -     Vitamin D,25-Hydroxy; Future    5. Attention or concentration deficit  Comments:  will get tested- given contact info and if consistent, reach out so I can place a referral to .    6. BRCA2 positive  Comments:  s/p b/l mastecotomy and reconstuction. Adv to enroll in Ov screen program until can decide on oopherectomy/ hyst. s/p salpinegctomy.    Ov screen    Mag supplements  Increase iron intake.      Health Maintenance   Topic Date Due    COVID-19 Vaccine (5 - 2024-25 season) 07/29/2025 (Originally 9/1/2024)    INFLUENZA VACCINE  10/01/2025    PAP SMEAR  05/24/2026    ANNUAL PHYSICAL  07/15/2026    TDAP/TD VACCINES (3 - Td or Tdap)  01/29/2030    HEPATITIS C SCREENING  Completed    Pneumococcal Vaccine 0-49  Aged Out       Discussion/Summary  Impression: health maintenance visit. Currently, she eats an adequate diet and has an adequate exercise regimen.   Cervical cancer screening:Pap smear is current.   Breast cancer screening: mammogram is current.   Colorectal cancer screening: colonoscopy is current.  Osteoporosis screening: Bone mineral density test is .   CT low dose screen -  Screening lab work includes hemoglobin, glucose, lipid profile, thyroid function testing, 25-hydroxyvitamin D and urinalysis.   Immunizations are needed, immunizations will be given as outlined in the orders   Advice and education were given regarding cardiovascular risk reduction, healthy dietary habits, Seatbelt and helmet use and self skin examination.     Return in about 1 year (around 7/15/2026) for Annual.      Electronically signed by:    Viridiana Pearson MD

## 2025-08-07 ENCOUNTER — OFFICE VISIT (OUTPATIENT)
Dept: BEHAVIORAL HEALTH | Facility: CLINIC | Age: 40
End: 2025-08-07
Payer: COMMERCIAL

## 2025-08-07 VITALS
SYSTOLIC BLOOD PRESSURE: 122 MMHG | HEART RATE: 82 BPM | DIASTOLIC BLOOD PRESSURE: 76 MMHG | HEIGHT: 66 IN | BODY MASS INDEX: 27.64 KG/M2 | WEIGHT: 172 LBS | OXYGEN SATURATION: 97 %

## 2025-08-07 DIAGNOSIS — Z13.30 ENCOUNTER FOR BEHAVIORAL HEALTH SCREENING: ICD-10-CM

## 2025-08-07 DIAGNOSIS — F90.0 ATTENTION DEFICIT HYPERACTIVITY DISORDER (ADHD), PREDOMINANTLY INATTENTIVE TYPE: Primary | ICD-10-CM

## 2025-08-07 RX ORDER — ATOMOXETINE 40 MG/1
40 CAPSULE ORAL DAILY
Qty: 30 CAPSULE | Refills: 0 | Status: SHIPPED | OUTPATIENT
Start: 2025-08-07

## 2025-08-15 ENCOUNTER — TRANSCRIBE ORDERS (OUTPATIENT)
Dept: ADMINISTRATIVE | Facility: HOSPITAL | Age: 40
End: 2025-08-15
Payer: COMMERCIAL

## 2025-08-15 DIAGNOSIS — R22.31 AXILLARY MASS, RIGHT: Primary | ICD-10-CM

## (undated) DEVICE — STPLR SKIN SUBCUTICULAR INSORB 2030

## (undated) DEVICE — TP PLSTC CLR TRNSPORE 1IN SURG

## (undated) DEVICE — COATED VICRYL  (POLYGLACTIN 910) SUTURE, VIOLET BRAIDED, STERILE, SYNTHETIC ABSORBABLE SUTURE: Brand: COATED VICRYL

## (undated) DEVICE — JP PERF DRN SIL FLT 10MM FULL: Brand: CARDINAL HEALTH

## (undated) DEVICE — 39" SINGLE PATIENT USE HOVERMATT: Brand: SINGLE PATIENT USE HOVERMATT

## (undated) DEVICE — BLANKT WARM LOWR/BDY 100X120CM

## (undated) DEVICE — CVR HNDL LIGHT RIGID

## (undated) DEVICE — SUT ETHLN 3/0 PC5 18IN 1893G

## (undated) DEVICE — SHEET,DRAPE,40X58,STERILE: Brand: MEDLINE

## (undated) DEVICE — SUT MNCRYL PLS ANTIB UD 3/0 PS2 27IN

## (undated) DEVICE — BLAKE SILICONE DRAIN, 15 FR ROUND, HUBLESS WITH 3/16" TROCAR: Brand: BLAKE

## (undated) DEVICE — BIOPATCH™ ANTIMICROBIAL DRESSING WITH CHLORHEXIDINE GLUCONATE IS A HYDROPHILLIC POLYURETHANE ABSORPTIVE FOAM WITH CHLORHEXIDINE GLUCONATE (CHG) WHICH INHIBITS BACTERIAL GROWTH UNDER THE DRESSING. THE DRESSING IS INTENDED TO BE USED TO ABSORB EXUDATE, COVER A WOUND CAUSED BY VASCULAR AND NONVASCULAR PERCUTANEOUS MEDICAL DEVICES DURING SURGERY, AS WELL AS REDUCE LOCAL INFECTION AND COLONIZATION OF MICROORGANISMS.: Brand: BIOPATCH

## (undated) DEVICE — PK C/SECT 10

## (undated) DEVICE — CLN MEGADYNE TP SS

## (undated) DEVICE — BOWL UTIL STRL 32OZ

## (undated) DEVICE — SOL IRR H2O BTL 1000ML STRL

## (undated) DEVICE — SUT SILK 2/0 SH CR8 18IN CR8 C012D

## (undated) DEVICE — ELECTRD BLD EZ CLN STD 4IN

## (undated) DEVICE — DRSNG SURESITE WNDW 4X4.5

## (undated) DEVICE — PCH DRN BRST RECONSTRUCT BRA LXF

## (undated) DEVICE — ELECTRD BLD EZ CLN MOD XLNG 2.75IN

## (undated) DEVICE — DEV DEL BRST/IMP GEL KELLER2 FUNNEL EA/5

## (undated) DEVICE — SUT ETHLN 2/0 PS 18IN 585H

## (undated) DEVICE — BANDAGE,GAUZE,BULKEE II,4.5"X4.1YD,STRL: Brand: MEDLINE

## (undated) DEVICE — TOTAL TRAY, 16FR 10ML SIL FOLEY, URN: Brand: MEDLINE

## (undated) DEVICE — JACKSON-PRATT 100CC BULB RESERVOIR: Brand: CARDINAL HEALTH

## (undated) DEVICE — TBG PENCL TELESCP MEGADYNE SMOKE EVAC 10FT

## (undated) DEVICE — ANTIBACTERIAL UNDYED BRAIDED (POLYGLACTIN 910), SYNTHETIC ABSORBABLE SUTURE: Brand: COATED VICRYL

## (undated) DEVICE — SUT MNCRYL 3/0 SH 27IN UD MCP416H

## (undated) DEVICE — SUT MNCRYL PLS ANTIB UD 4/0 PS2 18IN

## (undated) DEVICE — TRY SPINE BLCK WHITACRE 25G 3X5IN

## (undated) DEVICE — LEX GENERAL BREAST: Brand: MEDLINE INDUSTRIES, INC.

## (undated) DEVICE — TRAP FLD MINIVAC MEGADYNE 100ML

## (undated) DEVICE — ELECTRD BLD MEGADYNE 2.5IN SS

## (undated) DEVICE — DEV OPN LIGASURE CRV 180D 36MM 13.5CM  1P/U

## (undated) DEVICE — PATIENT RETURN ELECTRODE, SINGLE-USE, CONTACT QUALITY MONITORING, ADULT, WITH 9FT CORD, FOR PATIENTS WEIGING OVER 33LBS. (15KG): Brand: MEGADYNE

## (undated) DEVICE — PK CHST BRST 10

## (undated) DEVICE — UNDERGLV SURG BIOGEL INDICAT PI SZ8 BLU

## (undated) DEVICE — GAUZE FLUFF 1 PLY: Brand: DEROYAL

## (undated) DEVICE — GLV SURG SENSICARE PI MIC PF SZ6.5 LF STRL

## (undated) DEVICE — 3M™ IOBAN™ 2 ANTIMICROBIAL INCISE DRAPE 6650EZ: Brand: IOBAN™ 2

## (undated) DEVICE — SYRINGE, LUER LOCK, 60ML: Brand: MEDLINE

## (undated) DEVICE — SUT MNCRYL 3/0 27L Y523H BX/36

## (undated) DEVICE — GLV SURG SENSICARE W/ALOE PF LF 6.5 STRL

## (undated) DEVICE — PCH INST SURG INVISISHIELD 2PCKT

## (undated) DEVICE — STERILE PVP: Brand: MEDLINE INDUSTRIES, INC.

## (undated) DEVICE — DRSNG SURESITE WNDW 2.38X2.75

## (undated) DEVICE — SUT VIC 2/0 CT1 27IN J339H BX/36

## (undated) DEVICE — BNDG ELAS W/CLIP 6IN 10YD LF STRL

## (undated) DEVICE — GLV SURG SENSICARE PI MIC PF SZ7.5 LF STRL

## (undated) DEVICE — INTENDED FOR TISSUE SEPARATION, AND OTHER PROCEDURES THAT REQUIRE A SHARP SURGICAL BLADE TO PUNCTURE OR CUT.: Brand: BARD-PARKER ® SAFETYLOCK CARBON RIB-BACK BLADES

## (undated) DEVICE — SUT PLAIN  3/0 CT1 27IN 842H

## (undated) DEVICE — TOWEL,OR,DSP,ST,BLUE,STD,4/PK,20PK/CS: Brand: MEDLINE

## (undated) DEVICE — SPNG LAP PREWSH SFTPK 18X18IN STRL PK/5

## (undated) DEVICE — SUT SILK 2/0 PS 18IN 1588H

## (undated) DEVICE — SKN PREP SPNG STKS PVP PNT STR: Brand: MEDLINE INDUSTRIES, INC.

## (undated) DEVICE — TRY SKINPREP DRYPREP

## (undated) DEVICE — SOL IRR NACL 0.9PCT BT 1000ML